# Patient Record
Sex: MALE | Race: OTHER | NOT HISPANIC OR LATINO | Employment: UNEMPLOYED | URBAN - METROPOLITAN AREA
[De-identification: names, ages, dates, MRNs, and addresses within clinical notes are randomized per-mention and may not be internally consistent; named-entity substitution may affect disease eponyms.]

---

## 2019-10-11 ENCOUNTER — TRANSCRIBE ORDERS (OUTPATIENT)
Dept: VASCULAR SURGERY | Facility: CLINIC | Age: 64
End: 2019-10-11

## 2019-10-11 DIAGNOSIS — I73.9 PAD (PERIPHERAL ARTERY DISEASE) (HCC): Primary | ICD-10-CM

## 2019-10-22 ENCOUNTER — TELEPHONE (OUTPATIENT)
Dept: VASCULAR SURGERY | Facility: CLINIC | Age: 64
End: 2019-10-22

## 2019-10-22 NOTE — TELEPHONE ENCOUNTER
Called [facility] and spoke with Arelis and arranged transportation for pts appt on 10/24 in the [place] office  I called the pt and let him know the above information

## 2019-10-24 ENCOUNTER — OFFICE VISIT (OUTPATIENT)
Dept: VASCULAR SURGERY | Facility: CLINIC | Age: 64
End: 2019-10-24
Payer: COMMERCIAL

## 2019-10-24 VITALS
SYSTOLIC BLOOD PRESSURE: 138 MMHG | TEMPERATURE: 96.6 F | WEIGHT: 146 LBS | BODY MASS INDEX: 23.46 KG/M2 | HEIGHT: 66 IN | DIASTOLIC BLOOD PRESSURE: 82 MMHG | RESPIRATION RATE: 18 BRPM | HEART RATE: 76 BPM

## 2019-10-24 DIAGNOSIS — I73.9 CLAUDICATION OF RIGHT LOWER EXTREMITY (HCC): Primary | ICD-10-CM

## 2019-10-24 DIAGNOSIS — I73.9 PAD (PERIPHERAL ARTERY DISEASE) (HCC): ICD-10-CM

## 2019-10-24 PROCEDURE — 99213 OFFICE O/P EST LOW 20 MIN: CPT | Performed by: SURGERY

## 2019-10-24 RX ORDER — RIVAROXABAN 20 MG/1
TABLET, FILM COATED ORAL
Refills: 0 | COMMUNITY
Start: 2019-10-11 | End: 2020-04-03 | Stop reason: ALTCHOICE

## 2019-10-24 RX ORDER — RIVAROXABAN 15 MG/1
15 TABLET, FILM COATED ORAL 2 TIMES DAILY
Refills: 0 | COMMUNITY
Start: 2019-10-10 | End: 2020-04-03 | Stop reason: ALTCHOICE

## 2019-10-24 RX ORDER — AMLODIPINE BESYLATE 5 MG/1
TABLET ORAL
COMMUNITY
Start: 2019-10-11 | End: 2020-04-03 | Stop reason: SDUPTHER

## 2019-10-24 RX ORDER — ATORVASTATIN CALCIUM 40 MG/1
40 TABLET, FILM COATED ORAL
Refills: 3 | COMMUNITY
Start: 2019-10-11 | End: 2020-04-03 | Stop reason: SDUPTHER

## 2019-10-24 RX ORDER — ASPIRIN 81 MG/1
81 TABLET ORAL DAILY
Refills: 3 | COMMUNITY
Start: 2019-10-11 | End: 2020-04-03 | Stop reason: SDUPTHER

## 2019-10-24 NOTE — LETTER
October 24, 2019     Shivani Garcia MD  [facility]    Patient: Jesenia Mc   YOB: 1955   Date of Visit: 10/24/2019       Dear Dr Abram Cervantes:    Thank you for referring Jesenia Mc to me for evaluation  Below are my notes for this consultation  If you have questions, please do not hesitate to call me  I look forward to following your patient along with you           Sincerely,        Mayuri Leong MD        CC: [facility]

## 2019-10-24 NOTE — PROGRESS NOTES
Assessment/Plan:    Claudication of right lower extremity ([facility] )  Presents with improving claudication symptoms with history of right popliteal artery occlusion  Is currently on anticoagulation medications is also helping from a symptom standpoint  He may be having mild ischemic rest pain of his right foot which is improve with reverse Trendelenburg position which he will set up at home  Plan:  Follow-up lower extremity arterial study in 3 months and then outpatient appointment for further evaluation and recommendations       Diagnoses and all orders for this visit:    Claudication of right lower extremity ([facility] )    PAD (peripheral artery disease) ([place] [address] )  -     Ambulatory referral to Vascular Surgery    Other orders  -     amLODIPine (NORVASC) 5 mg tablet  -     aspirin (ECOTRIN LOW STRENGTH) 81 mg EC tablet; Take 81 mg by mouth daily  -     atorvastatin (LIPITOR) 40 mg tablet; Take 40 mg by mouth daily at bedtime  -     XARELTO 15 MG tablet; Take 15 mg by mouth 2 (two) times a day  -     XARELTO 20 MG tablet; TAKE ONE TABLET BY MOUTH DAILY AFTER TAKING 21 DAYS OF XARELTO 15MG        Subjective:      Patient ID: Cecilia Jauregui is a 66 y o  male  HPI history of right popliteal artery occlusion with pain and discomfort right lower extremity  This has improved over time he is having possibly mild ischemic rest pain of asked him to keep his leg down with reverse Trendelenburg position while sleeping  No tissue loss at this time      The following portions of the patient's history were reviewed and updated as appropriate: allergies, current medications, past family history, past medical history, past social history, past surgical history and problem list     Review of Systems  right lower extremity claudication, no tissue loss, no GI  symptoms no conscious symptoms no skin rash all other systems neck  Objective:      /82 (BP Location: Right arm, Patient Position: Sitting, Cuff Size: Adult)   Pulse 76   Temp Marcus Scanlon ) 96 6 °F (35 9 °C) (Tympanic)   Resp 18   Ht 5' 6" (1 676 m)   Wt 66 2 kg (146 lb)   BMI 23 57 kg/m²          Physical Exam      Oriented x3 no evidence of clinical depression  Eyes:  Sclera non-icteric    Skin: normal without evidence of inflammation    Neck is supple carotid pulses equal bilaterally no bruits heard    Chest lungs clear, heart regular rhythm  Abdomen soft nontender no evidence of pulsatile masses  Pulses are palpable bilateral Femoral    Neurological exam intact cranial nerves 2-12 grossly intact no gross motor sensory deficits detected  Imaging viewed and reviewed with Patient  I have reviewed and made appropriate changes to the review of systems input by the medical assistant  Vitals:    10/24/19 1128   BP: 138/82   BP Location: Right arm   Patient Position: Sitting   Cuff Size: Adult   Pulse: 76   Resp: 18   Temp: (!) 96 6 °F (35 9 °C)   TempSrc: Tympanic   Weight: 66 2 kg (146 lb)   Height: 5' 6" (1 676 m)       Patient Active Problem List   Diagnosis    Claudication of right lower extremity (HCC)       No past surgical history on file  No family history on file      Social History     Socioeconomic History    Marital status: Single     Spouse name: Not on file    Number of children: Not on file    Years of education: Not on file    Highest education level: Not on file   Occupational History    Not on file   Social Needs    Financial resource strain: Not on file    Food insecurity:     Worry: Not on file     Inability: Not on file    Transportation needs:     Medical: Not on file     Non-medical: Not on file   Tobacco Use    Smoking status: Not on file   Substance and Sexual Activity    Alcohol use: Not on file    Drug use: Not on file    Sexual activity: Not on file   Lifestyle    Physical activity:     Days per week: Not on file     Minutes per session: Not on file    Stress: Not on file   Relationships    Social connections:     Talks on phone: Not on file     Gets together: Not on file     Attends Alevism service: Not on file     Active member of club or organization: Not on file     Attends meetings of clubs or organizations: Not on file     Relationship status: Not on file    Intimate partner violence:     Fear of current or ex partner: Not on file     Emotionally abused: Not on file     Physically abused: Not on file     Forced sexual activity: Not on file   Other Topics Concern    Not on file   Social History Narrative    Not on file       No Known Allergies      Current Outpatient Medications:     amLODIPine (NORVASC) 5 mg tablet, , Disp: , Rfl:     aspirin (ECOTRIN LOW STRENGTH) 81 mg EC tablet, Take 81 mg by mouth daily, Disp: , Rfl: 3    atorvastatin (LIPITOR) 40 mg tablet, Take 40 mg by mouth daily at bedtime, Disp: , Rfl: 3    XARELTO 15 MG tablet, Take 15 mg by mouth 2 (two) times a day, Disp: , Rfl: 0    XARELTO 20 MG tablet, TAKE ONE TABLET BY MOUTH DAILY AFTER TAKING 21 DAYS OF XARELTO 15MG, Disp: , Rfl: 0

## 2019-10-24 NOTE — PATIENT INSTRUCTIONS
Presents with improving claudication symptoms with history of right popliteal artery occlusion  Is currently on anticoagulation medications is also helping from a symptom standpoint  He may be having mild ischemic rest pain of his right foot which is improve with reverse Trendelenburg position which he will set up at home      Plan:  Follow-up lower extremity arterial study in 3 months and then outpatient appointment for further evaluation and recommendations

## 2020-01-06 ENCOUNTER — TELEPHONE (OUTPATIENT)
Dept: ADMINISTRATIVE | Facility: HOSPITAL | Age: 65
End: 2020-01-06

## 2020-01-06 NOTE — TELEPHONE ENCOUNTER
----- Message from Araceli Tamayo sent at 10/18/2018  8:39 AM CDT -----  Contact: Self   Pt calling to speak to a nurse regarding health. [phone]     Lmom to schedule BRANDO and OV for 3 m f/u

## 2020-04-03 ENCOUNTER — TELEMEDICINE (OUTPATIENT)
Dept: FAMILY MEDICINE CLINIC | Facility: CLINIC | Age: 65
End: 2020-04-03
Payer: COMMERCIAL

## 2020-04-03 DIAGNOSIS — E78.00 HYPERCHOLESTEREMIA: ICD-10-CM

## 2020-04-03 DIAGNOSIS — I73.9 CLAUDICATION OF RIGHT LOWER EXTREMITY (HCC): ICD-10-CM

## 2020-04-03 DIAGNOSIS — M25.50 CHRONIC PAIN OF MULTIPLE JOINTS: ICD-10-CM

## 2020-04-03 DIAGNOSIS — I10 ESSENTIAL HYPERTENSION: Primary | ICD-10-CM

## 2020-04-03 DIAGNOSIS — Z86.718 HISTORY OF DVT (DEEP VEIN THROMBOSIS): ICD-10-CM

## 2020-04-03 DIAGNOSIS — G89.29 CHRONIC PAIN OF MULTIPLE JOINTS: ICD-10-CM

## 2020-04-03 PROCEDURE — G2012 BRIEF CHECK IN BY MD/QHP: HCPCS | Performed by: NURSE PRACTITIONER

## 2020-04-03 RX ORDER — ASPIRIN 81 MG/1
81 TABLET ORAL DAILY
Qty: 90 TABLET | Refills: 0 | Status: SHIPPED | OUTPATIENT
Start: 2020-04-03 | End: 2020-07-02

## 2020-04-03 RX ORDER — ATORVASTATIN CALCIUM 40 MG/1
40 TABLET, FILM COATED ORAL
Qty: 90 TABLET | Refills: 0 | Status: SHIPPED | OUTPATIENT
Start: 2020-04-03 | End: 2020-07-02

## 2020-04-03 RX ORDER — MELOXICAM 15 MG/1
15 TABLET ORAL DAILY
Qty: 90 TABLET | Refills: 0 | Status: SHIPPED | OUTPATIENT
Start: 2020-04-03 | End: 2020-07-02

## 2020-04-03 RX ORDER — AMMONIUM LACTATE 12 G/100G
1 LOTION TOPICAL 2 TIMES DAILY PRN
COMMUNITY
End: 2020-04-03 | Stop reason: SDUPTHER

## 2020-04-03 RX ORDER — MELOXICAM 15 MG/1
15 TABLET ORAL DAILY
COMMUNITY
End: 2020-04-03 | Stop reason: SDUPTHER

## 2020-04-03 RX ORDER — AMMONIUM LACTATE 12 G/100G
1 LOTION TOPICAL 2 TIMES DAILY PRN
Qty: 400 G | Refills: 0 | Status: SHIPPED | OUTPATIENT
Start: 2020-04-03 | End: 2020-07-02

## 2020-04-03 RX ORDER — AMLODIPINE BESYLATE 5 MG/1
5 TABLET ORAL DAILY
Qty: 90 TABLET | Refills: 0 | Status: SHIPPED | OUTPATIENT
Start: 2020-04-03 | End: 2020-07-02

## 2020-07-02 DIAGNOSIS — Z86.718 HISTORY OF DVT (DEEP VEIN THROMBOSIS): ICD-10-CM

## 2020-07-02 DIAGNOSIS — G89.29 CHRONIC PAIN OF MULTIPLE JOINTS: ICD-10-CM

## 2020-07-02 DIAGNOSIS — I10 ESSENTIAL HYPERTENSION: ICD-10-CM

## 2020-07-02 DIAGNOSIS — E78.00 HYPERCHOLESTEREMIA: ICD-10-CM

## 2020-07-02 DIAGNOSIS — M25.50 CHRONIC PAIN OF MULTIPLE JOINTS: ICD-10-CM

## 2020-07-02 DIAGNOSIS — I73.9 CLAUDICATION OF RIGHT LOWER EXTREMITY (HCC): ICD-10-CM

## 2020-07-02 RX ORDER — ASPIRIN 81 MG/1
TABLET, COATED ORAL
Qty: 90 TABLET | Refills: 0 | Status: SHIPPED | OUTPATIENT
Start: 2020-07-02 | End: 2020-11-27 | Stop reason: SDUPTHER

## 2020-07-02 RX ORDER — ATORVASTATIN CALCIUM 40 MG/1
40 TABLET, FILM COATED ORAL
Qty: 90 TABLET | Refills: 0 | Status: SHIPPED | OUTPATIENT
Start: 2020-07-02 | End: 2020-11-27 | Stop reason: SDUPTHER

## 2020-07-02 RX ORDER — MELOXICAM 15 MG/1
15 TABLET ORAL DAILY
Qty: 90 TABLET | Refills: 0 | Status: SHIPPED | OUTPATIENT
Start: 2020-07-02 | End: 2020-10-14 | Stop reason: SDUPTHER

## 2020-07-02 RX ORDER — AMMONIUM LACTATE 12 G/100G
1 LOTION TOPICAL 2 TIMES DAILY PRN
Qty: 400 G | Refills: 0 | Status: SHIPPED | OUTPATIENT
Start: 2020-07-02 | End: 2021-01-25 | Stop reason: SDUPTHER

## 2020-07-02 RX ORDER — AMLODIPINE BESYLATE 5 MG/1
5 TABLET ORAL DAILY
Qty: 90 TABLET | Refills: 0 | Status: SHIPPED | OUTPATIENT
Start: 2020-07-02 | End: 2020-11-27

## 2020-07-09 ENCOUNTER — TRANSCRIBE ORDERS (OUTPATIENT)
Dept: ADMINISTRATIVE | Facility: HOSPITAL | Age: 65
End: 2020-07-09

## 2020-07-09 DIAGNOSIS — J44.9 OBSTRUCTIVE CHRONIC BRONCHITIS WITHOUT EXACERBATION (HCC): Primary | ICD-10-CM

## 2020-10-01 DIAGNOSIS — J44.9 CHRONIC OBSTRUCTIVE PULMONARY DISEASE, UNSPECIFIED COPD TYPE (HCC): Primary | ICD-10-CM

## 2020-10-14 DIAGNOSIS — M25.50 CHRONIC PAIN OF MULTIPLE JOINTS: ICD-10-CM

## 2020-10-14 DIAGNOSIS — G89.29 CHRONIC PAIN OF MULTIPLE JOINTS: ICD-10-CM

## 2020-10-14 RX ORDER — MELOXICAM 15 MG/1
15 TABLET ORAL DAILY
Qty: 90 TABLET | Refills: 0 | Status: SHIPPED | OUTPATIENT
Start: 2020-10-14 | End: 2020-11-19

## 2020-11-03 DIAGNOSIS — M25.50 ARTHRALGIA, UNSPECIFIED JOINT: Primary | ICD-10-CM

## 2020-11-05 DIAGNOSIS — I10 ESSENTIAL HYPERTENSION: ICD-10-CM

## 2020-11-05 RX ORDER — AMLODIPINE BESYLATE 5 MG/1
5 TABLET ORAL DAILY
Qty: 90 TABLET | Refills: 0 | Status: CANCELLED | OUTPATIENT
Start: 2020-11-05 | End: 2021-02-03

## 2020-11-05 RX ORDER — AMLODIPINE BESYLATE 2.5 MG/1
2.5 TABLET ORAL DAILY
COMMUNITY
End: 2020-11-05 | Stop reason: SDUPTHER

## 2020-11-06 RX ORDER — AMLODIPINE BESYLATE 2.5 MG/1
2.5 TABLET ORAL DAILY
Qty: 90 TABLET | Refills: 1 | Status: SHIPPED | OUTPATIENT
Start: 2020-11-06 | End: 2020-11-27

## 2020-11-18 DIAGNOSIS — G89.29 CHRONIC PAIN OF MULTIPLE JOINTS: ICD-10-CM

## 2020-11-18 DIAGNOSIS — M25.50 CHRONIC PAIN OF MULTIPLE JOINTS: ICD-10-CM

## 2020-11-19 RX ORDER — MELOXICAM 15 MG/1
15 TABLET ORAL DAILY
Qty: 90 TABLET | Refills: 0 | Status: SHIPPED | OUTPATIENT
Start: 2020-11-19 | End: 2020-11-27 | Stop reason: SDUPTHER

## 2020-11-25 DIAGNOSIS — M25.50 ARTHRALGIA, UNSPECIFIED JOINT: ICD-10-CM

## 2020-11-27 ENCOUNTER — TELEMEDICINE (OUTPATIENT)
Dept: FAMILY MEDICINE CLINIC | Facility: CLINIC | Age: 65
End: 2020-11-27
Payer: COMMERCIAL

## 2020-11-27 ENCOUNTER — TELEPHONE (OUTPATIENT)
Dept: FAMILY MEDICINE CLINIC | Facility: CLINIC | Age: 65
End: 2020-11-27

## 2020-11-27 DIAGNOSIS — I73.9 CLAUDICATION OF RIGHT LOWER EXTREMITY (HCC): ICD-10-CM

## 2020-11-27 DIAGNOSIS — E78.00 HYPERCHOLESTEREMIA: ICD-10-CM

## 2020-11-27 DIAGNOSIS — M25.50 CHRONIC PAIN OF MULTIPLE JOINTS: ICD-10-CM

## 2020-11-27 DIAGNOSIS — Z11.59 NEED FOR HEPATITIS C SCREENING TEST: ICD-10-CM

## 2020-11-27 DIAGNOSIS — J44.9 CHRONIC OBSTRUCTIVE PULMONARY DISEASE, UNSPECIFIED COPD TYPE (HCC): Primary | ICD-10-CM

## 2020-11-27 DIAGNOSIS — Z86.718 HISTORY OF DVT (DEEP VEIN THROMBOSIS): ICD-10-CM

## 2020-11-27 DIAGNOSIS — J44.9 CHRONIC OBSTRUCTIVE PULMONARY DISEASE, UNSPECIFIED COPD TYPE (HCC): ICD-10-CM

## 2020-11-27 DIAGNOSIS — I10 ESSENTIAL HYPERTENSION: Primary | ICD-10-CM

## 2020-11-27 DIAGNOSIS — Z12.11 SCREENING FOR COLON CANCER: ICD-10-CM

## 2020-11-27 DIAGNOSIS — G89.29 CHRONIC PAIN OF MULTIPLE JOINTS: ICD-10-CM

## 2020-11-27 PROBLEM — J45.40 MODERATE PERSISTENT ASTHMA WITHOUT COMPLICATION: Status: ACTIVE | Noted: 2020-11-27

## 2020-11-27 PROCEDURE — 99213 OFFICE O/P EST LOW 20 MIN: CPT | Performed by: NURSE PRACTITIONER

## 2020-11-27 RX ORDER — FLUTICASONE FUROATE AND VILANTEROL 100; 25 UG/1; UG/1
1 POWDER RESPIRATORY (INHALATION) DAILY
Qty: 3 INHALER | Refills: 1 | Status: SHIPPED | OUTPATIENT
Start: 2020-11-27 | End: 2020-12-30

## 2020-11-27 RX ORDER — ATORVASTATIN CALCIUM 40 MG/1
40 TABLET, FILM COATED ORAL
Qty: 90 TABLET | Refills: 0 | Status: SHIPPED | OUTPATIENT
Start: 2020-11-27 | End: 2021-02-24

## 2020-11-27 RX ORDER — AMLODIPINE BESYLATE 5 MG/1
5 TABLET ORAL DAILY
Qty: 90 TABLET | Refills: 1 | Status: SHIPPED | OUTPATIENT
Start: 2020-11-27 | End: 2021-02-09

## 2020-11-27 RX ORDER — ASPIRIN 81 MG/1
81 TABLET ORAL DAILY
Qty: 90 TABLET | Refills: 0 | Status: SHIPPED | OUTPATIENT
Start: 2020-11-27 | End: 2021-01-05 | Stop reason: SDUPTHER

## 2020-11-27 RX ORDER — MELOXICAM 15 MG/1
15 TABLET ORAL DAILY
Qty: 90 TABLET | Refills: 0 | Status: SHIPPED | OUTPATIENT
Start: 2020-11-27 | End: 2021-02-03

## 2020-12-14 ENCOUNTER — TELEPHONE (OUTPATIENT)
Dept: FAMILY MEDICINE CLINIC | Facility: CLINIC | Age: 65
End: 2020-12-14

## 2020-12-16 DIAGNOSIS — M25.50 ARTHRALGIA, UNSPECIFIED JOINT: ICD-10-CM

## 2020-12-30 ENCOUNTER — TELEPHONE (OUTPATIENT)
Dept: FAMILY MEDICINE CLINIC | Facility: CLINIC | Age: 65
End: 2020-12-30

## 2020-12-30 DIAGNOSIS — J45.20 MILD INTERMITTENT ASTHMA, UNSPECIFIED WHETHER COMPLICATED: ICD-10-CM

## 2020-12-30 DIAGNOSIS — J41.0 SIMPLE CHRONIC BRONCHITIS (HCC): Primary | ICD-10-CM

## 2021-01-05 DIAGNOSIS — I73.9 CLAUDICATION OF RIGHT LOWER EXTREMITY (HCC): ICD-10-CM

## 2021-01-05 DIAGNOSIS — Z86.718 HISTORY OF DVT (DEEP VEIN THROMBOSIS): ICD-10-CM

## 2021-01-05 RX ORDER — ASPIRIN 81 MG/1
81 TABLET ORAL DAILY
Qty: 90 TABLET | Refills: 0 | Status: SHIPPED | OUTPATIENT
Start: 2021-01-05 | End: 2021-01-25 | Stop reason: SDUPTHER

## 2021-01-06 DIAGNOSIS — M25.50 ARTHRALGIA, UNSPECIFIED JOINT: ICD-10-CM

## 2021-01-25 DIAGNOSIS — Z86.718 HISTORY OF DVT (DEEP VEIN THROMBOSIS): ICD-10-CM

## 2021-01-25 DIAGNOSIS — I73.9 CLAUDICATION OF RIGHT LOWER EXTREMITY (HCC): ICD-10-CM

## 2021-01-25 RX ORDER — ASPIRIN 81 MG/1
81 TABLET ORAL DAILY
Qty: 90 TABLET | Refills: 0 | Status: SHIPPED | OUTPATIENT
Start: 2021-01-25 | End: 2021-02-11

## 2021-01-25 RX ORDER — AMMONIUM LACTATE 12 G/100G
1 LOTION TOPICAL 2 TIMES DAILY PRN
Qty: 400 G | Refills: 0 | Status: SHIPPED | OUTPATIENT
Start: 2021-01-25 | End: 2021-02-11

## 2021-02-03 DIAGNOSIS — G89.29 CHRONIC PAIN OF MULTIPLE JOINTS: ICD-10-CM

## 2021-02-03 DIAGNOSIS — M25.50 CHRONIC PAIN OF MULTIPLE JOINTS: ICD-10-CM

## 2021-02-03 RX ORDER — MELOXICAM 15 MG/1
15 TABLET ORAL DAILY
Qty: 90 TABLET | Refills: 0 | Status: SHIPPED | OUTPATIENT
Start: 2021-02-03 | End: 2021-03-24

## 2021-02-09 DIAGNOSIS — M25.50 ARTHRALGIA, UNSPECIFIED JOINT: ICD-10-CM

## 2021-02-09 DIAGNOSIS — I10 ESSENTIAL HYPERTENSION: ICD-10-CM

## 2021-02-09 RX ORDER — AMLODIPINE BESYLATE 5 MG/1
5 TABLET ORAL DAILY
Qty: 90 TABLET | Refills: 1 | Status: SHIPPED | OUTPATIENT
Start: 2021-02-09 | End: 2021-05-24 | Stop reason: SDUPTHER

## 2021-02-11 DIAGNOSIS — I73.9 CLAUDICATION OF RIGHT LOWER EXTREMITY (HCC): ICD-10-CM

## 2021-02-11 DIAGNOSIS — Z86.718 HISTORY OF DVT (DEEP VEIN THROMBOSIS): ICD-10-CM

## 2021-02-11 RX ORDER — ASPIRIN 81 MG/1
81 TABLET ORAL DAILY
Qty: 90 TABLET | Refills: 0 | Status: SHIPPED | OUTPATIENT
Start: 2021-02-11 | End: 2021-05-24 | Stop reason: SDUPTHER

## 2021-02-11 RX ORDER — AMMONIUM LACTATE 12 G/100G
1 LOTION TOPICAL 2 TIMES DAILY PRN
Qty: 450 G | Refills: 1 | Status: SHIPPED | OUTPATIENT
Start: 2021-02-11 | End: 2021-03-22

## 2021-02-23 DIAGNOSIS — E78.00 HYPERCHOLESTEREMIA: ICD-10-CM

## 2021-02-24 RX ORDER — ATORVASTATIN CALCIUM 40 MG/1
40 TABLET, FILM COATED ORAL
Qty: 90 TABLET | Refills: 0 | Status: SHIPPED | OUTPATIENT
Start: 2021-02-24 | End: 2021-03-10

## 2021-03-03 DIAGNOSIS — M25.50 ARTHRALGIA, UNSPECIFIED JOINT: ICD-10-CM

## 2021-03-10 DIAGNOSIS — E78.00 HYPERCHOLESTEREMIA: ICD-10-CM

## 2021-03-10 RX ORDER — ATORVASTATIN CALCIUM 40 MG/1
40 TABLET, FILM COATED ORAL
Qty: 90 TABLET | Refills: 0 | Status: SHIPPED | OUTPATIENT
Start: 2021-03-10 | End: 2021-05-24 | Stop reason: SDUPTHER

## 2021-03-22 DIAGNOSIS — I73.9 CLAUDICATION OF RIGHT LOWER EXTREMITY (HCC): ICD-10-CM

## 2021-03-22 RX ORDER — AMMONIUM LACTATE 12 G/100G
1 LOTION TOPICAL 2 TIMES DAILY PRN
Qty: 450 G | Refills: 1 | Status: SHIPPED | OUTPATIENT
Start: 2021-03-22 | End: 2021-04-06 | Stop reason: ALTCHOICE

## 2021-03-24 ENCOUNTER — TELEPHONE (OUTPATIENT)
Dept: FAMILY MEDICINE CLINIC | Facility: CLINIC | Age: 66
End: 2021-03-24

## 2021-03-24 DIAGNOSIS — M25.50 CHRONIC PAIN OF MULTIPLE JOINTS: ICD-10-CM

## 2021-03-24 DIAGNOSIS — G89.29 CHRONIC PAIN OF MULTIPLE JOINTS: ICD-10-CM

## 2021-03-24 DIAGNOSIS — M25.50 ARTHRALGIA, UNSPECIFIED JOINT: ICD-10-CM

## 2021-03-24 RX ORDER — MELOXICAM 15 MG/1
15 TABLET ORAL DAILY
Qty: 90 TABLET | Refills: 0 | Status: SHIPPED | OUTPATIENT
Start: 2021-03-24 | End: 2021-05-13

## 2021-03-24 NOTE — TELEPHONE ENCOUNTER
Noted that concern was change of mental status, patient is going all over the house having BMs and urinating and not cognizant or aware of himself     We have tried calling back and leaving messages and left text message to have patient be brought to the hospital to get evaluated    Unable to get neither patient nor brother     After multiple calls to contact pt or brother and not successful-->we contacted that police department at [facility] to do an  officer check  [address]  by at the home

## 2021-03-26 RX ORDER — AMLODIPINE BESYLATE 2.5 MG/1
2.5 TABLET ORAL DAILY
COMMUNITY
Start: 2021-02-03 | End: 2021-05-14 | Stop reason: ALTCHOICE

## 2021-04-06 ENCOUNTER — TELEPHONE (OUTPATIENT)
Dept: FAMILY MEDICINE CLINIC | Facility: CLINIC | Age: 66
End: 2021-04-06

## 2021-04-06 ENCOUNTER — LAB (OUTPATIENT)
Dept: LAB | Facility: HOSPITAL | Age: 66
End: 2021-04-06
Payer: COMMERCIAL

## 2021-04-06 ENCOUNTER — TRANSCRIBE ORDERS (OUTPATIENT)
Dept: ADMINISTRATIVE | Facility: HOSPITAL | Age: 66
End: 2021-04-06

## 2021-04-06 ENCOUNTER — HOSPITAL ENCOUNTER (OUTPATIENT)
Dept: CT IMAGING | Facility: HOSPITAL | Age: 66
Discharge: HOME | End: 2021-04-06
Payer: COMMERCIAL

## 2021-04-06 ENCOUNTER — HOSPITAL ENCOUNTER (OUTPATIENT)
Dept: RADIOLOGY | Facility: HOSPITAL | Age: 66
Discharge: HOME | End: 2021-04-06
Payer: COMMERCIAL

## 2021-04-06 ENCOUNTER — OFFICE VISIT (OUTPATIENT)
Dept: FAMILY MEDICINE CLINIC | Facility: CLINIC | Age: 66
End: 2021-04-06
Payer: COMMERCIAL

## 2021-04-06 VITALS
DIASTOLIC BLOOD PRESSURE: 70 MMHG | RESPIRATION RATE: 17 BRPM | TEMPERATURE: 97 F | HEIGHT: 67 IN | HEART RATE: 84 BPM | SYSTOLIC BLOOD PRESSURE: 138 MMHG | BODY MASS INDEX: 22.13 KG/M2 | OXYGEN SATURATION: 99 % | WEIGHT: 141 LBS

## 2021-04-06 DIAGNOSIS — R41.89 COGNITIVE CHANGE: Primary | ICD-10-CM

## 2021-04-06 DIAGNOSIS — R94.31 ABNORMAL EKG: ICD-10-CM

## 2021-04-06 DIAGNOSIS — R45.1 AGITATED: ICD-10-CM

## 2021-04-06 DIAGNOSIS — R41.3 MEMORY DEFICIT: ICD-10-CM

## 2021-04-06 DIAGNOSIS — R06.02 SHORTNESS OF BREATH: ICD-10-CM

## 2021-04-06 DIAGNOSIS — R63.4 WEIGHT LOSS: ICD-10-CM

## 2021-04-06 DIAGNOSIS — R41.89 COGNITIVE CHANGE: ICD-10-CM

## 2021-04-06 DIAGNOSIS — B34.9 VIRAL INFECTION, UNSPECIFIED: ICD-10-CM

## 2021-04-06 DIAGNOSIS — R46.89 COGNITIVE AND BEHAVIORAL CHANGES: ICD-10-CM

## 2021-04-06 DIAGNOSIS — Z87.898 HISTORY OF DRUG USE: ICD-10-CM

## 2021-04-06 DIAGNOSIS — Z87.898 PERSONAL HISTORY OF OTHER LYMPHATIC AND HEMATOPOIETIC NEOPLASM: ICD-10-CM

## 2021-04-06 DIAGNOSIS — R41.89 COGNITIVE AND BEHAVIORAL CHANGES: ICD-10-CM

## 2021-04-06 DIAGNOSIS — F68.8 PERSONALITY CHANGE: ICD-10-CM

## 2021-04-06 DIAGNOSIS — R41.0 CONFUSION: ICD-10-CM

## 2021-04-06 DIAGNOSIS — R41.89 COGNITIVE AND BEHAVIORAL CHANGES: Primary | ICD-10-CM

## 2021-04-06 DIAGNOSIS — Z72.89 ALCOHOL USE: ICD-10-CM

## 2021-04-06 DIAGNOSIS — N39.46 MIXED STRESS AND URGE URINARY INCONTINENCE: ICD-10-CM

## 2021-04-06 DIAGNOSIS — R79.89 POSITIVE D DIMER: ICD-10-CM

## 2021-04-06 DIAGNOSIS — R46.89 COGNITIVE AND BEHAVIORAL CHANGES: Primary | ICD-10-CM

## 2021-04-06 LAB
ALBUMIN SERPL BCP-MCNC: 5.2 G/DL (ref 3.4–4.8)
ALP SERPL-CCNC: 57.8 U/L (ref 10–129)
ALT SERPL W P-5'-P-CCNC: 14 U/L (ref 5–63)
AMMONIA PLAS-SCNC: 27.45 UMOL/L
ANION GAP SERPL CALCULATED.3IONS-SCNC: 8 MMOL/L (ref 4–13)
AST SERPL W P-5'-P-CCNC: 18 U/L (ref 15–41)
BACTERIA UR QL AUTO: ABNORMAL /HPF
BASOPHILS # BLD AUTO: 0.03 THOUSANDS/ΜL (ref 0–0.1)
BASOPHILS NFR BLD AUTO: 1 % (ref 0–1)
BILIRUB SERPL-MCNC: 0.37 MG/DL (ref 0.3–1.2)
BILIRUB UR QL STRIP: ABNORMAL
BNP SERPL-MCNC: 20.3 PG/ML (ref 1–100)
BUN SERPL-MCNC: 14 MG/DL (ref 6–20)
CALCIUM SERPL-MCNC: 10.6 MG/DL (ref 8.4–10.2)
CHLORIDE SERPL-SCNC: 105 MMOL/L (ref 96–108)
CHOLEST SERPL-MCNC: 150 MG/DL
CLARITY UR: ABNORMAL
CO2 SERPL-SCNC: 29 MMOL/L (ref 22–33)
COLOR UR: YELLOW
CREAT SERPL-MCNC: 1.09 MG/DL (ref 0.5–1.2)
D DIMER PPP FEU-MCNC: 1.11 MG/L FEU (ref 0.19–0.49)
EOSINOPHIL # BLD AUTO: 0.18 THOUSAND/ΜL (ref 0–0.61)
EOSINOPHIL NFR BLD AUTO: 3 % (ref 0–6)
ERYTHROCYTE [DISTWIDTH] IN BLOOD BY AUTOMATED COUNT: 13.4 % (ref 11.6–15.1)
EST. AVERAGE GLUCOSE BLD GHB EST-MCNC: 114 MG/DL
GFR SERPL CREATININE-BSD FRML MDRD: 72 ML/MIN/1.73SQ M
GLUCOSE P FAST SERPL-MCNC: 101 MG/DL (ref 70–105)
GLUCOSE UR STRIP-MCNC: NEGATIVE MG/DL
HAV IGM SER QL: NORMAL
HBA1C MFR BLD: 5.6 %
HBV CORE IGM SER QL: NORMAL
HBV SURFACE AG SER QL: NORMAL
HCT VFR BLD AUTO: 45.3 % (ref 36.5–49.3)
HCV AB SER QL: NORMAL
HDLC SERPL-MCNC: 32 MG/DL
HGB BLD-MCNC: 15.6 G/DL (ref 12–17)
HGB UR QL STRIP.AUTO: NEGATIVE
IMM GRANULOCYTES # BLD AUTO: 0.01 THOUSAND/UL (ref 0–0.2)
IMM GRANULOCYTES NFR BLD AUTO: 0 % (ref 0–2)
KETONES UR STRIP-MCNC: NEGATIVE MG/DL
LDLC SERPL CALC-MCNC: 96 MG/DL (ref 0–100)
LEUKOCYTE ESTERASE UR QL STRIP: NEGATIVE
LYMPHOCYTES # BLD AUTO: 1.97 THOUSANDS/ΜL (ref 0.6–4.47)
LYMPHOCYTES NFR BLD AUTO: 30 % (ref 14–44)
MCH RBC QN AUTO: 30.9 PG (ref 26.8–34.3)
MCHC RBC AUTO-ENTMCNC: 34.4 G/DL (ref 31.4–37.4)
MCV RBC AUTO: 90 FL (ref 82–98)
MONOCYTES # BLD AUTO: 0.43 THOUSAND/ΜL (ref 0.17–1.22)
MONOCYTES NFR BLD AUTO: 7 % (ref 4–12)
MUCOUS THREADS UR QL AUTO: ABNORMAL
NEUTROPHILS # BLD AUTO: 3.88 THOUSANDS/ΜL (ref 1.85–7.62)
NEUTS SEG NFR BLD AUTO: 59 % (ref 43–75)
NITRITE UR QL STRIP: NEGATIVE
NON-SQ EPI CELLS URNS QL MICRO: ABNORMAL /HPF
NONHDLC SERPL-MCNC: 118 MG/DL
PH UR STRIP.AUTO: 6 [PH]
PLATELET # BLD AUTO: 218 THOUSANDS/UL (ref 149–390)
PMV BLD AUTO: 11.4 FL (ref 8.9–12.7)
POTASSIUM SERPL-SCNC: 3.7 MMOL/L (ref 3.5–5)
PROT SERPL-MCNC: 8.5 G/DL (ref 6.4–8.3)
PROT UR STRIP-MCNC: ABNORMAL MG/DL
RBC # BLD AUTO: 5.05 MILLION/UL (ref 3.88–5.62)
RBC #/AREA URNS AUTO: ABNORMAL /HPF
SODIUM SERPL-SCNC: 142 MMOL/L (ref 133–145)
SP GR UR STRIP.AUTO: >=1.03 (ref 1–1.03)
TRIGL SERPL-MCNC: 111.7 MG/DL
TROPONIN I SERPL-MCNC: <0.03 NG/ML (ref 0–0.07)
TSH SERPL DL<=0.05 MIU/L-ACNC: 1.32 UIU/ML (ref 0.34–5.6)
UROBILINOGEN UR QL STRIP.AUTO: 1 E.U./DL
WBC # BLD AUTO: 6.5 THOUSAND/UL (ref 4.31–10.16)
WBC #/AREA URNS AUTO: ABNORMAL /HPF

## 2021-04-06 PROCEDURE — 81001 URINALYSIS AUTO W/SCOPE: CPT | Performed by: NURSE PRACTITIONER

## 2021-04-06 PROCEDURE — 83036 HEMOGLOBIN GLYCOSYLATED A1C: CPT

## 2021-04-06 PROCEDURE — 84443 ASSAY THYROID STIM HORMONE: CPT

## 2021-04-06 PROCEDURE — 84484 ASSAY OF TROPONIN QUANT: CPT

## 2021-04-06 PROCEDURE — 36415 COLL VENOUS BLD VENIPUNCTURE: CPT

## 2021-04-06 PROCEDURE — 71046 X-RAY EXAM CHEST 2 VIEWS: CPT

## 2021-04-06 PROCEDURE — U0003 INFECTIOUS AGENT DETECTION BY NUCLEIC ACID (DNA OR RNA); SEVERE ACUTE RESPIRATORY SYNDROME CORONAVIRUS 2 (SARS-COV-2) (CORONAVIRUS DISEASE [COVID-19]), AMPLIFIED PROBE TECHNIQUE, MAKING USE OF HIGH THROUGHPUT TECHNOLOGIES AS DESCRIBED BY CMS-2020-01-R: HCPCS | Performed by: NURSE PRACTITIONER

## 2021-04-06 PROCEDURE — 85379 FIBRIN DEGRADATION QUANT: CPT

## 2021-04-06 PROCEDURE — 80053 COMPREHEN METABOLIC PANEL: CPT

## 2021-04-06 PROCEDURE — 86592 SYPHILIS TEST NON-TREP QUAL: CPT

## 2021-04-06 PROCEDURE — U0005 INFEC AGEN DETEC AMPLI PROBE: HCPCS | Performed by: NURSE PRACTITIONER

## 2021-04-06 PROCEDURE — 85025 COMPLETE CBC W/AUTO DIFF WBC: CPT

## 2021-04-06 PROCEDURE — 80074 ACUTE HEPATITIS PANEL: CPT

## 2021-04-06 PROCEDURE — G1004 CDSM NDSC: HCPCS

## 2021-04-06 PROCEDURE — 80061 LIPID PANEL: CPT

## 2021-04-06 PROCEDURE — 99215 OFFICE O/P EST HI 40 MIN: CPT | Performed by: NURSE PRACTITIONER

## 2021-04-06 PROCEDURE — 82140 ASSAY OF AMMONIA: CPT

## 2021-04-06 PROCEDURE — 86618 LYME DISEASE ANTIBODY: CPT

## 2021-04-06 PROCEDURE — 74018 RADEX ABDOMEN 1 VIEW: CPT

## 2021-04-06 PROCEDURE — 83880 ASSAY OF NATRIURETIC PEPTIDE: CPT

## 2021-04-06 PROCEDURE — 70450 CT HEAD/BRAIN W/O DYE: CPT

## 2021-04-06 NOTE — TELEPHONE ENCOUNTER
Called patient's brother since patient's phone was off (unable to leave message)  Patient was with brother so both are aware he has a STAT CT of chest scheduled 4/7/21 @ 2:00 PM at [address] [place] [address] Drive instructions to patient and told to call our office if any questions/issues

## 2021-04-06 NOTE — PROGRESS NOTES
Assessment/Plan:  SPENT OVER 45 minutes with patient and extensive work up and review of labs and diagnostics     Presents in office with brother   follow up multiple issues  And acute changes noted by this provider  change of mental status   weakness, frequent falls , intermittent confusion for the last 4 weeks , weight loss, personality and behavioral changes - urinating all over the house  Intermittent confusion     On exam - right sided weakness and mild deviation of the tongue to the right  Which is new to this provider  patient urinated in office while being examined   Increased headaches in the last few months   Brother states that patient sometimes would be staring at the walls and when asked what he is doing he is confused   Urinates in bed times and sometimes all over the house   No real rhyme or reason  Patient was initially recommended to go to the ER however refusing to go, brother requesting that work up is done and he will take him everywhere  He is aware that if something comes  Back abnormal he will have to go to the ER     Patient has had multiple unwitnessed falls and not sure if any injuries to the head but does have bruises  He is aware of self   But not aware of time and place   He is aware of his brother   And when asked if he sees any issues with his behavior he states" I am fine"   Does have history of Drug use - and current recent history of alcohol  Intake       Will have him follow up with Neurology and psychiatry for jassi yin awaiting complete work up       Problem List Items Addressed This Visit     None      Visit Diagnoses     Cognitive and behavioral changes    -  Primary    Relevant Orders    CT head wo contrast (Completed)    Comprehensive metabolic panel (Completed)    CBC and differential (Completed)    TSH, 3rd generation (Completed)    Lipid panel (Completed)    HEMOGLOBIN A1C W/ EAG ESTIMATION (Completed)    D-dimer, quantitative (Completed)    Hepatitis C antibody NT-BNP PRO    Troponin I (Completed)    Lyme Antibody Profile with reflex to WB (Completed)    Hepatitis panel, acute (Completed)    UA (URINE) with reflex to Scope (Completed)    XR chest pa & lateral    XR abdomen 1 view kub    Ambulatory referral to Cardiology    Ambulatory referral to Neurology    Urine Microscopic (Completed)    HEMOGLOBIN A1C W/ EAG ESTIMATION (Completed)    RPR    Ambulatory Referral to [facility]    Ambulatory referral to Neurology    Ambulatory referral to Psychiatry    Confusion        Relevant Orders    CT head wo contrast (Completed)    Comprehensive metabolic panel (Completed)    CBC and differential (Completed)    TSH, 3rd generation (Completed)    Lipid panel (Completed)    HEMOGLOBIN A1C W/ EAG ESTIMATION (Completed)    D-dimer, quantitative (Completed)    Hepatitis C antibody    NT-BNP PRO    Troponin I (Completed)    Lyme Antibody Profile with reflex to WB (Completed)    Hepatitis panel, acute (Completed)    UA (URINE) with reflex to Scope (Completed)    XR chest pa & lateral    XR abdomen 1 view kub    Ambulatory referral to Cardiology    Ambulatory referral to Neurology    Ammonia (Completed)    Urine Microscopic (Completed)    HEMOGLOBIN A1C W/ EAG ESTIMATION (Completed)    RPR    Ambulatory Referral to [address] [place] [address]    Ambulatory referral to Neurology    Ambulatory referral to Psychiatry    Memory deficit        Relevant Orders    CT head wo contrast (Completed)    Ambulatory referral to Cardiology    Ambulatory referral to Neurology    Ammonia (Completed)    HEMOGLOBIN A1C W/ EAG ESTIMATION (Completed)    RPR    Ambulatory Referral to [facility]    Ambulatory referral to Neurology    Ambulatory referral to Psychiatry    Agitated        Relevant Orders    CT head wo contrast (Completed)    Comprehensive metabolic panel (Completed)    CBC and differential (Completed)    TSH, 3rd generation (Completed)    Lipid panel (Completed)    HEMOGLOBIN A1C W/ EAG ESTIMATION (Completed) D-dimer, quantitative (Completed)    Hepatitis C antibody    NT-BNP PRO    Troponin I (Completed)    Lyme Antibody Profile with reflex to WB (Completed)    Hepatitis panel, acute (Completed)    UA (URINE) with reflex to Scope (Completed)    XR chest pa & lateral    XR abdomen 1 view kub    Ambulatory referral to Cardiology    Ambulatory referral to Neurology    Ammonia (Completed)    Urine Microscopic (Completed)    HEMOGLOBIN A1C W/ EAG ESTIMATION (Completed)    RPR    [facility] to [facility]    Ambulatory referral to Neurology    Ambulatory referral to Psychiatry    Personality change        Relevant Orders    CT head wo contrast (Completed)    Comprehensive metabolic panel (Completed)    CBC and differential (Completed)    TSH, 3rd generation (Completed)    Lipid panel (Completed)    HEMOGLOBIN A1C W/ EAG ESTIMATION (Completed)    D-dimer, quantitative (Completed)    Hepatitis C antibody    NT-BNP PRO    Troponin I (Completed)    Lyme Antibody Profile with reflex to WB (Completed)    Hepatitis panel, acute (Completed)    UA (URINE) with reflex to Scope (Completed)    XR chest pa & lateral    XR abdomen 1 view kub    Ambulatory referral to Cardiology    Ambulatory referral to Neurology    Urine Microscopic (Completed)    HEMOGLOBIN A1C W/ EAG ESTIMATION (Completed)    RPR    Ambulatory Referral to [facility]    Ambulatory referral to Neurology    Ambulatory referral to Psychiatry    Alcohol use        Relevant Orders    Ambulatory referral to Cardiology    Ambulatory referral to Neurology    HEMOGLOBIN A1C W/ EAG ESTIMATION (Completed)    Ambulatory Referral to [facility]    Ambulatory referral to Neurology    Ambulatory referral to Psychiatry    History of drug use        Relevant Orders    CT head wo contrast (Completed)    Comprehensive metabolic panel (Completed)    CBC and differential (Completed)    TSH, 3rd generation (Completed)    Lipid panel (Completed)    HEMOGLOBIN A1C W/ EAG ESTIMATION (Completed)    D-dimer, quantitative (Completed)    Hepatitis C antibody    NT-BNP PRO    Troponin I (Completed)    Lyme Antibody Profile with reflex to WB (Completed)    Hepatitis panel, acute (Completed)    UA (URINE) with reflex to Scope (Completed)    XR chest pa & lateral    XR abdomen 1 view kub    Ambulatory referral to Cardiology    Ambulatory referral to Neurology    Urine Microscopic (Completed)    Ambulatory Referral to  Place [facility]    Ambulatory referral to Neurology    Ambulatory referral to Psychiatry    Mixed stress and urge urinary incontinence        Relevant Orders    CT head wo contrast (Completed)    UA (URINE) with reflex to Scope (Completed)    Ambulatory referral to Cardiology    Ambulatory referral to Neurology    Urine Microscopic (Completed)    HEMOGLOBIN A1C W/ EAG ESTIMATION (Completed)    Ambulatory Referral to  Place [facility]    Ambulatory referral to Neurology    Ambulatory referral to Psychiatry    Viral infection, unspecified        Relevant Orders    Novel Coronavirus (Covid-19),PCR [facility] - Collected at [facility] or [facility]    Abnormal EKG        Relevant Orders    Ambulatory referral to Cardiology    Ambulatory referral to Neurology    Ambulatory Referral to [address] [place] [address] Son[facility]    Ambulatory referral to Neurology    Ambulatory referral to Psychiatry    Positive D dimer        Relevant Orders    Ambulatory referral to Neurology    Ambulatory referral to Psychiatry    CTA chest pe study    Weight loss        Relevant Orders    HEMOGLOBIN A1C W/ EAG ESTIMATION (Completed)    Ambulatory Referral to [address] [place] [address] Sonu Stewart    Ambulatory referral to Neurology    Ambulatory referral to Psychiatry    Shortness of breath        Relevant Orders    Ambulatory Referral to [address] [place] [address] Kelby Nettles    Ambulatory referral to Neurology    Ambulatory referral to Psychiatry    CTA chest pe study            Subjective:      Patient ID: Pearl Collins is a 63 y o  male      Presents in office with brother   follow up multiple issues  And acute changes noted by this provider  change of mental status   weakness, frequent falls , intermittent confusion for the last 4 weeks , weight loss, personality and behavioral changes - urinating all over the house  Intermittent confusion     On exam - right sided weakness and mild deviation of the tongue to the right  Which is new to this provider  patient urinated in office while being examined   Increased headaches in the last few months   Brother states that patient sometimes would be staring at the walls and when asked what he is doing he is confused   Urinates in bed times and sometimes all over the house   No real rhyme or reason  Patient was initially recommended to go to the ER however refusing to go, brother requesting that work up is done and he will take him everywhere  He is aware that if something comes  Back abnormal he will have to go to the ER     Patient Active Problem List:     Claudication of right lower extremity ([address] )     Essential hypertension     Hypercholesteremia     History of DVT (deep vein thrombosis)     Chronic pain of multiple joints     Moderate persistent asthma without complication        The following portions of the patient's history were reviewed and updated as appropriate:   Past Medical History:  He has a past medical history of Arthritis, Cancer ([address] ), High blood pressure, Hyperlipidemia, and Hypertension  ,  _______________________________________________________________________  Medical Problems:  does not have any pertinent problems on file ,  _______________________________________________________________________  Past Surgical History:   has a past surgical history that includes Excisional hemorrhoidectomy and Other surgical history  ,  _______________________________________________________________________  Family History:  family history includes Hypertension in his mother; No Known Problems in his father ,  _______________________________________________________________________  Social History:   reports that he has been smoking cigarettes  He has a 30 00 pack-year smoking history  He has never used smokeless tobacco  He reports current alcohol use  He reports that he does not use drugs  ,  _______________________________________________________________________  Allergies:  has No Known Allergies     _______________________________________________________________________  Current Outpatient Medications   Medication Sig Dispense Refill    amLODIPine (NORVASC) 2 5 mg tablet Take 2 5 mg by mouth daily      amLODIPine (NORVASC) 5 mg tablet TAKE 1 TABLET (5 MG TOTAL) BY MOUTH DAILY 90 tablet 1    aspirin (ECOTRIN LOW STRENGTH) 81 mg EC tablet TAKE 1 TABLET (81 MG TOTAL) BY MOUTH DAILY 90 tablet 0    atorvastatin (LIPITOR) 40 mg tablet TAKE 1 TABLET (40 MG TOTAL) BY MOUTH DAILY AT BEDTIME 90 tablet 0    meloxicam (MOBIC) 15 mg tablet TAKE 1 TABLET (15 MG TOTAL) BY MOUTH DAILY PLEASE TAKE WITH FOOD 90 tablet 0     No current facility-administered medications for this visit       _______________________________________________________________________  Review of Systems   Constitutional: Positive for fatigue and unexpected weight change (weight loss)  Negative for chills and fever  HENT: Negative for congestion and sore throat  Eyes: Negative  Respiratory: Positive for cough and shortness of breath  Cardiovascular: Negative for chest pain and palpitations  Gastrointestinal: Negative for abdominal distention, nausea and vomiting  Genitourinary:        Incontinence of urine    Musculoskeletal: Positive for arthralgias and gait problem  Shuffled gait and weakness and frequent falls    Skin:        Dry skin    Neurological: Positive for weakness and headaches  Hematological: Negative for adenopathy     Psychiatric/Behavioral: Positive for agitation, behavioral problems, confusion, decreased concentration and sleep disturbance  The patient is nervous/anxious  Objective:  Vitals:    04/06/21 1001   BP: 138/70   BP Location: Left arm   Patient Position: Sitting   Cuff Size: Standard   Pulse: 84   Resp: 17   Temp: (!) 97 °F (36 1 °C)   TempSrc: Temporal   SpO2: 99%   Weight: 64 kg (141 lb)   Height: 5' 7" (1 702 m)     Body mass index is 22 08 kg/m²  Physical Exam  Vitals signs and nursing note reviewed  Constitutional:       Comments: To self only   Confused to time and place    HENT:      Head: Atraumatic  Eyes:      Extraocular Movements: Extraocular movements intact  Neck:      Musculoskeletal: Normal range of motion  Cardiovascular:      Rate and Rhythm: Normal rate and regular rhythm  Pulses: Normal pulses  Heart sounds: Normal heart sounds  Pulmonary:      Breath sounds: Rhonchi present  Comments: Decreased lung sounds   Shortness of breath   Skin:     General: Skin is dry  Neurological:      Mental Status: He is alert  He is disoriented  Sensory: Sensory deficit present  Motor: Weakness present        Coordination: Coordination abnormal       Gait: Gait abnormal       Deep Tendon Reflexes: Reflexes abnormal       Comments: Right tongue deviation , mild but noticeable and right side arm and leg weaker then left

## 2021-04-07 ENCOUNTER — HOSPITAL ENCOUNTER (OUTPATIENT)
Dept: CT IMAGING | Facility: HOSPITAL | Age: 66
Discharge: HOME | End: 2021-04-07
Payer: COMMERCIAL

## 2021-04-07 ENCOUNTER — TELEPHONE (OUTPATIENT)
Dept: FAMILY MEDICINE CLINIC | Facility: CLINIC | Age: 66
End: 2021-04-07

## 2021-04-07 DIAGNOSIS — R79.89 POSITIVE D DIMER: ICD-10-CM

## 2021-04-07 DIAGNOSIS — R06.02 SHORTNESS OF BREATH: ICD-10-CM

## 2021-04-07 LAB
B BURGDOR IGG+IGM SER-ACNC: 67
EST. AVERAGE GLUCOSE BLD GHB EST-MCNC: 120 MG/DL
HBA1C MFR BLD: 5.8 %
SARS-COV-2 RNA RESP QL NAA+PROBE: NEGATIVE

## 2021-04-07 PROCEDURE — 71275 CT ANGIOGRAPHY CHEST: CPT

## 2021-04-07 PROCEDURE — G1004 CDSM NDSC: HCPCS

## 2021-04-07 RX ADMIN — IOHEXOL 85 ML: 350 INJECTION, SOLUTION INTRAVENOUS at 14:02

## 2021-04-07 NOTE — TELEPHONE ENCOUNTER
[facility] pre encounter dept called - stat brain CT wo contrast ordered yesterday was not approved by insurance - [facility] is requesting a peer to peer review      Phone # for Ruper[address] Goel [address] [phone]  Tracking # [id number]

## 2021-04-07 NOTE — PATIENT INSTRUCTIONS
Altered Mental Status   WHAT YOU NEED TO KNOW:   Altered mental status (AMS) is a disruption in how your brain works that causes a change in behavior  This change can happen suddenly or over days  AMS ranges from slight confusion to total disorientation and increased sleepiness to coma  DISCHARGE INSTRUCTIONS:   Medicines:   · Antibiotics  help fight or prevent infection  Take your antibiotics until they are gone, even if you feel better  · Take your medicine as directed  Contact your healthcare provider if you think your medicine is not helping or if you have side effects  Tell him of her if you are allergic to any medicine  Keep a list of the medicines, vitamins, and herbs you take  Include the amounts, and when and why you take them  Bring the list or the pill bottles to follow-up visits  Carry your medicine list with you in case of an emergency  Follow up with your healthcare provider as directed:  Write down your questions so you remember to ask them during your visits  Contact your healthcare provider if:   · You have sudden changes in behavior  · You are more sleepy or confused than usual      · You have questions or concerns about your condition or care  Seek care immediately or call 911 if:   · Your speech is slurred or you are rambling  · You have a seizure  · You are not able to move any part of your body freely  · Someone close to you cannot wake you up  © Copyright [address] Information is for End User's use only and may not be sold, redistributed or otherwise used for commercial purposes  All illustrations and images included in CareNotes® are the copyrighted property of A D A M , Inc  or [facility] Cecy Xie   The above information is an  only  It is not intended as medical advice for individual conditions or treatments  Talk to your doctor, nurse or pharmacist before following any medical regimen to see if it is safe and effective for you

## 2021-04-08 DIAGNOSIS — J45.20 MILD INTERMITTENT ASTHMA, UNSPECIFIED WHETHER COMPLICATED: ICD-10-CM

## 2021-04-08 DIAGNOSIS — J41.0 SIMPLE CHRONIC BRONCHITIS (HCC): ICD-10-CM

## 2021-04-08 LAB — RPR SER QL: NORMAL

## 2021-04-14 ENCOUNTER — TELEPHONE (OUTPATIENT)
Dept: UROLOGY | Facility: AMBULATORY SURGERY CENTER | Age: 66
End: 2021-04-14

## 2021-04-14 ENCOUNTER — OFFICE VISIT (OUTPATIENT)
Dept: FAMILY MEDICINE CLINIC | Facility: CLINIC | Age: 66
End: 2021-04-14
Payer: COMMERCIAL

## 2021-04-14 VITALS
TEMPERATURE: 96.6 F | SYSTOLIC BLOOD PRESSURE: 116 MMHG | BODY MASS INDEX: 22.6 KG/M2 | HEIGHT: 67 IN | HEART RATE: 72 BPM | RESPIRATION RATE: 16 BRPM | WEIGHT: 144 LBS | DIASTOLIC BLOOD PRESSURE: 76 MMHG

## 2021-04-14 DIAGNOSIS — R46.89 COGNITIVE AND BEHAVIORAL CHANGES: ICD-10-CM

## 2021-04-14 DIAGNOSIS — E78.00 HYPERCHOLESTEREMIA: ICD-10-CM

## 2021-04-14 DIAGNOSIS — J45.40 MODERATE PERSISTENT ASTHMA WITHOUT COMPLICATION: Primary | ICD-10-CM

## 2021-04-14 DIAGNOSIS — F41.8 DEPRESSION WITH ANXIETY: ICD-10-CM

## 2021-04-14 DIAGNOSIS — Z00.00 ANNUAL PHYSICAL EXAM: ICD-10-CM

## 2021-04-14 DIAGNOSIS — G89.29 CHRONIC PAIN OF MULTIPLE JOINTS: ICD-10-CM

## 2021-04-14 DIAGNOSIS — I10 ESSENTIAL HYPERTENSION: ICD-10-CM

## 2021-04-14 DIAGNOSIS — R32 URINARY INCONTINENCE, UNSPECIFIED TYPE: ICD-10-CM

## 2021-04-14 DIAGNOSIS — M25.50 CHRONIC PAIN OF MULTIPLE JOINTS: ICD-10-CM

## 2021-04-14 DIAGNOSIS — J42 CHRONIC BRONCHITIS, UNSPECIFIED CHRONIC BRONCHITIS TYPE (HCC): ICD-10-CM

## 2021-04-14 DIAGNOSIS — R41.89 COGNITIVE DECLINE: ICD-10-CM

## 2021-04-14 DIAGNOSIS — I73.9 CLAUDICATION OF RIGHT LOWER EXTREMITY (HCC): ICD-10-CM

## 2021-04-14 DIAGNOSIS — R41.89 COGNITIVE AND BEHAVIORAL CHANGES: ICD-10-CM

## 2021-04-14 PROCEDURE — 4004F PT TOBACCO SCREEN RCVD TLK: CPT | Performed by: NURSE PRACTITIONER

## 2021-04-14 PROCEDURE — 3078F DIAST BP <80 MM HG: CPT | Performed by: NURSE PRACTITIONER

## 2021-04-14 PROCEDURE — 3725F SCREEN DEPRESSION PERFORMED: CPT | Performed by: NURSE PRACTITIONER

## 2021-04-14 PROCEDURE — 99396 PREV VISIT EST AGE 40-64: CPT | Performed by: NURSE PRACTITIONER

## 2021-04-14 PROCEDURE — 3008F BODY MASS INDEX DOCD: CPT | Performed by: NURSE PRACTITIONER

## 2021-04-14 PROCEDURE — 3074F SYST BP LT 130 MM HG: CPT | Performed by: NURSE PRACTITIONER

## 2021-04-14 RX ORDER — CEPHALEXIN 500 MG/1
500 CAPSULE ORAL EVERY 12 HOURS SCHEDULED
Qty: 10 CAPSULE | Refills: 0 | Status: SHIPPED | OUTPATIENT
Start: 2021-04-14 | End: 2021-04-19

## 2021-04-14 RX ORDER — CITALOPRAM 10 MG/1
10 TABLET ORAL DAILY
Qty: 30 TABLET | Refills: 1 | Status: SHIPPED | OUTPATIENT
Start: 2021-04-14 | End: 2021-05-07

## 2021-04-14 NOTE — TELEPHONE ENCOUNTER
[facility] to offer OV for R32 (ICD-10-CM) - Urinary incontinence, unspecified type referred by PCP  Provided # for return call  Schedule Patient when call is returned-no OV held

## 2021-04-16 ENCOUNTER — TELEPHONE (OUTPATIENT)
Dept: FAMILY MEDICINE CLINIC | Facility: CLINIC | Age: 66
End: 2021-04-16

## 2021-04-16 NOTE — TELEPHONE ENCOUNTER
THAIS Vega pt had intake with [facility] today   They will be faxing over an order to be signed for OT to be done twice a week for 4 weeks

## 2021-04-18 NOTE — PROGRESS NOTES
ADULT ANNUAL PHYSICAL  [facility] CARE [place]    NAME: Carols Self  AGE: 63 y o  SEX: male  : 1955     DATE: 2021     Assessment and Plan:   Presents in office for follow up multiple issues and annual physical needed for caregaps  Accompanied by brother  States he is feeling much better     ICD-10-CM PL 1  Annual physical exam   Z00 00 Change Dx 2  Moderate persistent asthma without complication   S05 74 Change Dx 3  Essential hypertension   I10 Change Dx 4  Chronic pain of multiple joints   M25 50     Change Dx 5  Claudication of right lower extremity (HCC)   I73 9 Change Dx 6  Hypercholesteremia   E78 00 Change Dx 7  Depression with anxiety   F41 8 Change Dx 8  Urinary incontinence, unspecified type   R32 Change Dx 9  Chronic bronchitis, unspecified chronic bronchitis type ([address] )   J42 Change Dx 10  Cognitive and behavioral changes   R41 89     Change Dx 11   Cognitive decline   R41 89 Change Dx    Cognitive changes / decline could be related to a neurological issue vs depression - discussed adding treatment for depression for now follow up 4-6 weeks   He is to follow up with Neurology   Discussed better management of Asthma and follow up with PULM to make sure he is not having any respiratory issues     Problem List Items Addressed This Visit        Respiratory    Moderate persistent asthma without complication - Primary    Relevant Medications    fluticasone-salmeterol (ADVAIR, [address]) 250-50 mcg/dose inhaler       Cardiovascular and Mediastinum    Essential hypertension       Other    Claudication of right lower extremity ([address] )    Hypercholesteremia    Chronic pain of multiple joints      Other Visit Diagnoses     Annual physical exam        Depression with anxiety        Relevant Medications    citalopram (CeleXA) 10 mg tablet    Urinary incontinence, unspecified type        Relevant Medications    cephalexin (KEFLEX) 500 mg capsule    Other Relevant Orders    Ambulatory referral to Urology    Chronic bronchitis, unspecified chronic bronchitis type ([facility] )        Relevant Medications    fluticasone-salmeterol ([address]) 250-50 mcg/dose inhaler    Other Relevant Orders    Ambulatory referral to Pulmonology    Cognitive and behavioral changes        Cognitive decline              Immunizations and preventive care screenings were discussed with patient today  Appropriate education was printed on patient's after visit summary  Counseling:  Alcohol/drug use: discussed moderation in alcohol intake, the recommendations for healthy alcohol use, and avoidance of illicit drug use  Dental Health: discussed importance of regular tooth brushing, flossing, and dental visits  Injury prevention: discussed safety/seat belts, safety helmets, smoke detectors, carbon dioxide detectors, and smoking near bedding or upholstery  Sexual health: discussed sexually transmitted diseases, partner selection, use of condoms, avoidance of unintended pregnancy, and contraceptive alternatives  · Exercise: the importance of regular exercise/physical activity was discussed  Recommend exercise 3-5 times per week for at least 30 minutes  Return in 4 weeks (on 5/12/2021)  Chief Complaint:     Chief Complaint   Patient presents with    Follow-up      History of Present Illness:     Adult Annual Physical   Patient here for a comprehensive physical exam  The patient reports problems - cognitive decline weakness and incontinence of urine   Diet and Physical Activity  · Diet/Nutrition: poor diet  · Exercise: no formal exercise  Depression Screening  PHQ-9 Depression Screening    PHQ-9:   Frequency of the following problems over the past two weeks:      Little interest or pleasure in doing things: 0 - not at all  Feeling down, depressed, or hopeless: 0 - not at all  PHQ-2 Score: 0       General Health  · Sleep: sleeps poorly     · Hearing: normal - bilateral   · Vision: no vision problems  · Dental: does not floss   Health  · Symptoms include: urinary frequency     Review of Systems:     Review of Systems   Constitutional: Positive for fatigue and unexpected weight change (weight loss)  Negative for chills and fever  HENT: Negative for congestion and sore throat  Eyes: Negative  Respiratory: Positive for cough, shortness of breath and wheezing  Cardiovascular: Negative for chest pain and palpitations  Gastrointestinal: Negative for abdominal distention, nausea and vomiting  Genitourinary:        Incontinence of urine    Musculoskeletal: Positive for arthralgias and gait problem  Shuffled gait and weakness and frequent falls    Skin:        Dry skin    Neurological: Positive for weakness and headaches  Hematological: Negative for adenopathy  Psychiatric/Behavioral: Positive for confusion, decreased concentration and sleep disturbance  Negative for agitation and behavioral problems  The patient is nervous/anxious         Past Medical History:     Past Medical History:   Diagnosis Date    Arthritis     [facility]     High blood pressure     Hyperlipidemia     Hypertension       Past Surgical History:     Past Surgical History:   Procedure Laterality Date    EXCISIONAL HEMORRHOIDECTOMY      OTHER SURGICAL HISTORY      stent       Family History:     Family History   Problem Relation Age of Onset    Hypertension Mother     No Known Problems Father       Social History:        Social History     Socioeconomic History    Marital status: Single     Spouse name: None    Number of children: None    Years of education: None    Highest education level: None   Occupational History    None   Social Needs    Financial resource strain: None    Food insecurity     Worry: None     Inability: None    Transportation needs     Medical: None     Non-medical: None   Tobacco Use    Smoking status: Current Every Day Smoker     Packs/day: 1 00     Years: 30 00     Pack years: 30 00     Types: Cigarettes    Smokeless tobacco: Never Used   Substance and Sexual Activity    Alcohol use: Yes     Frequency: 2-4 times a month     Comment: Occasional     Drug use: Never    Sexual activity: None   Lifestyle    Physical activity     Days per week: None     Minutes per session: None    Stress: None   Relationships    Social connections     Talks on phone: None     Gets together: None     Attends Latter day service: None     Active member of club or organization: None     Attends meetings of clubs or organizations: None     Relationship status: None    Intimate partner violence     Fear of current or ex partner: None     Emotionally abused: None     Physically abused: None     Forced sexual activity: None   Other Topics Concern    None   Social History Narrative    · Most recent tobacco use screenin2020     · Do you currently or have you served in the [employer]:   No      · Alcohol intake:   Occasional      · Are you currently employed:   No      · Occupational health risks:No history of asbestos exposure       - As per [employer]       Current Medications:     Current Outpatient Medications   Medication Sig Dispense Refill    amLODIPine (NORVASC) 5 mg tablet TAKE 1 TABLET (5 MG TOTAL) BY MOUTH DAILY 90 tablet 1    atorvastatin (LIPITOR) 40 mg tablet TAKE 1 TABLET (40 MG TOTAL) BY MOUTH DAILY AT BEDTIME 90 tablet 0    meloxicam (MOBIC) 15 mg tablet TAKE 1 TABLET (15 MG TOTAL) BY MOUTH DAILY PLEASE TAKE WITH FOOD 90 tablet 0    amLODIPine (NORVASC) 2 5 mg tablet Take 2 5 mg by mouth daily      aspirin (ECOTRIN LOW STRENGTH) 81 mg EC tablet TAKE 1 TABLET (81 MG TOTAL) BY MOUTH DAILY 90 tablet 0    cephalexin (KEFLEX) 500 mg capsule Take 1 capsule (500 mg total) by mouth every 12 (twelve) hours for 5 days 10 capsule 0    citalopram (CeleXA) 10 mg tablet Take 1 tablet (10 mg total) by mouth daily 30 tablet 1    fluticasone-salmeterol (Yun Pottier) 250-50 mcg/dose inhaler Inhale 1 puff 2 (two) times a day Rinse mouth after use       No current facility-administered medications for this visit  Allergies:     No Known Allergies   Physical Exam:     /76 (BP Location: Right arm, Patient Position: Sitting, Cuff Size: Adult)   Pulse 72   Temp (!) 96 6 °F (35 9 °C) (Tympanic Core)   Resp 16   Ht 5' 7" (1 702 m)   Wt 65 3 kg (144 lb)   BMI 22 55 kg/m²     Physical Exam  Vitals signs and nursing note reviewed  HENT:      Mouth/Throat:      Mouth: Mucous membranes are dry  Eyes:      Extraocular Movements: Extraocular movements intact  Cardiovascular:      Rate and Rhythm: Normal rate and regular rhythm  Heart sounds: Normal heart sounds  Pulmonary:      Breath sounds: Wheezing present  Abdominal:      Palpations: Abdomen is soft  Skin:     General: Skin is warm  Neurological:      Mental Status: He is alert  Motor: Weakness present  Coordination: Coordination abnormal       Novel Coronavirus (Covid-19),PCR [facility] - Collected at [facility] or [facility]  Order: [id number]  Status:  Final result   Visible to patient:  No (inaccessible in [address])   Next appt:  05/05/2021 at 01:40 PM in Pulmonology Elnior Fraga MD)   Dx:  Viral infection, unspecified  Specimen Information: Nasopharyngeal Swab         Ref Range & Units    SARS-CoV-2 Negative Negative       Narrative          RPR  Order: [id number]  Status:  Final result   Visible to patient:  No (inaccessible in [address])   Next appt:  05/05/2021 at 01:40 PM in Pulmonology Elinor Fraga MD)   Dx:  Confusion; Agitated; Memory deficit;        Ref Range & Units 4/6/21 11:30 AM   RPR Non-Reactive Non-Reactive          Specimen Collected: 04/06/21 11:30 AM   Last Resulted: 04/08/21 12:41 PM           Contains abnormal data HEMOGLOBIN A1C W/ EAG ESTIMATION  Order: [id number]  Status:  Final result   Visible to patient:  No (inaccessible in [address]) Next appt:  05/05/2021 at 01:40 PM in Pulmonology Padmini Mckinney MD)   Dx:  Confusion; Agitated; Weight loss; Mem    Ref Range & Units 4/6/21 11:30 AM   Hemoglobin A1C Normal 3 8-5 6%; PreDiabetic 5 7-6 4%; Diabetic >=6 5%; Glycemic control for adults with diabetes <7 0% % 5 8High     EAG mg/dl 120          Specimen Collected: 04/06/21 11:30 AM   Last Resulted: 04/07/21  4:20 AM           Contains abnormal data Urine Microscopic  Order: [id number] - Reflex for Order [id number]  Status:  Final result   Visible to patient:  No (inaccessible in [address])   Next appt:  05/05/2021 at 01:40 PM in Pulmonology Padmini Mckinney MD)   Dx:  Cognitive and behavioral changes; Con    Ref Range & Units 4/6/21 11:30 AM   RBC, UA None Seen, 0-1, 1-2, 2-4, 0-5 /hpf None Seen    WBC, UA None Seen, 0-1, 1-2, 0-5, 2-4 /hpf None Seen    Epithelial Cells None Seen, Occasional /hpf Occasional    Bacteria, UA None Seen, Occasional /hpf Occasional    MUCUS THREADS None Seen InnumerableAbnormal           Specimen Collected: 04/06/21 11:30 AM   Last Resulted: 04/06/21  1:45 PM           Contains abnormal data UA (URINE) with reflex to Scope  Order: [id number]  Status:  Final result   Visible to patient:  No (inaccessible in [address])   Next appt:  05/05/2021 at 01:40 PM in Pulmonology Padmini Mckinney MD)   Dx:  Cognitive and behavioral changes; Con       Ref Range & Units 4/6/21 11:30 AM   Color, UA Yellow Yellow    Clarity, UA Clear Slightly CloudyAbnormal     Specific Gravity, UA 1 001 - 1 030 >=1 030    pH, UA 5 0, 5 5, 6 0, 6 5, 7 0, 7 5, 8 0 6 0    Leukocytes, UA Negative Negative    Nitrite, UA Negative Negative    Protein, UA Negative, Interference- unable to analyze mg/dl TraceAbnormal     Glucose, UA Negative mg/dl Negative    Ketones, UA Negative mg/dl Negative    Urobilinogen, UA 0 2, 1 0 E U /dl E U /dl 1 0    Bilirubin, UA Negative 1+Abnormal     Blood, UA Negative Negative          Specimen Collected: 04/06/21 11:30 AM   Last Resulted: 04/06/21  1:03 PM              B-Type Natriuretic Peptide ([address])  Order: [id number]  Status:  Final result   Visible to patient:  No (inaccessible in [address])   Next appt:  05/05/2021 at 01:40 PM in Pulmonology Aileen Stokes MD)   Dx:  Personal history of other lymphatic a  Ref Range & Units 4/6/21 11:30 AM   BNP 1 - 100 pg/mL 20 3          Specimen Collected: 04/06/21 11:30 AM   Last Resulted: 04/06/21 12:28 PM              Ammonia  Order: [id number]  Status:  Final result   Visible to patient:  No (inaccessible in [address])   Next appt:  05/05/2021 at 01:40 PM in [facility] Aileen Stokes MD)   Dx:  Confusion; Agitated; Memory deficit   Ref Range & Units 4/6/21 11:30 AM   Ammonia <=36 umol/L 27 45    Comment: Specimen collection should occur prior to Sulfapyridine administration due to the potential for falsely depressed results  Specimen Collected: 04/06/21 11:30 AM   Last Resulted: 04/06/21 12:16 PM        Lab Flowsheet     Order Details           Hepatitis panel, acute  Order: [id number]  Status:  Final result   Visible to patient:  No (inaccessible in [address])   [facility]  [facility]   Dx:  Confusion; Agitated; Personality hua    Ref Range & Units 4/6/21 11:30 AM   Hepatitis B Surface Ag Non-reactive, NonReactive - Confirmed Non-reactive    Hep A IgM Non-reactive, Equivocal-Suggest Recollect Non-reactive    Hepatitis C Ab Non-reactive Non-reactive    Hep B C IgM Non-reactive Non-reactive          Specimen Collected: 04/06/21 11:30 AM   Last Resulted: 04/06/21  5:23 PM            Lyme Antibody Profile with reflex to WB  Order: [id number]  Status:  Final result   Visible to patient:  No (inaccessible in [address])   Next appt:  05/05/2021 [facility])   Dx:  Confusion; Agitated; Personality hua       Ref Range & Units 4/6/21 11:30 AM   Lyme total antibody 0 00 - 119 67    Comment: Negative (0-119) Absence of detectable Borrelia burgdoferi Antibodies  A negative result does not exclude the possibility of Borrelia infection  If early Lyme disease is suspected,a second sample should be collected & tested 4 weeks after initial testing  Specimen Collected: 04/06/21 11:30 AM   Last Resulted: 04/07/21  2:51 AM            Troponin I  Order: [id number]  Status:  Final result   Visible to patient:  No (inaccessible in [address])   Next appt:  05/05/2021 at 01:40 PM in Pulmonology Mj Isidro MD)   Dx:  Confusion; Agitated; Personality hua    Ref Range & Units 4/6/21 11:30 AM   Troponin I 0 00 - 0 07 ng/mL <0 03    Comment: Chad's Chemistry analyzer 99% cutoff is > 0 03ng/mL     o cTnl 99% cutoff is useful only when applied to patients in the clinical setting of myocardial ischemia   o cTnl 99% cutoff should be interpreted in the context of clinical history, ECG findings and possibly cardiac imaging to establish correct diagnosis  o cTnl 99% cutoff may be suggestive but clearly not indicative of a coronary event without the clinical setting of myocardial ischemia  Specimen Collected: 04/06/21 11:30 AM   Last Resulted: 04/06/21 12:33 PM         Contains abnormal data D-dimer, quantitative  Order: [id number]  Status:  Final result   Visible to patient:  No (inaccessible in [address])   Next appt:  05/05/2021 at 01:40 PM in Pulmonology Mj Isidro MD)   Dx:  Confusion; Agitated; Personality hua    Ref Range & Units 4/6/21 11:30 AM   D-Dimer, Quant  0 19 - 0 49 mg/L FEU 1  11High     Comment: Reference and upper limits to exclude DVT and PE are the same   Do not use to exclude if clinical symptoms are present           Specimen Collected: 04/06/21 11:30 AM   Last Resulted: 04/06/21 12:10 PM        Lab Flowsheet     Order Details           Contains abnormal data Lipid panel  Order: [id number]  Status:  Final result   Visible to patient:  No (inaccessible [address]   Next appt:  05/05/2021 at 01:40 PM in Pulmonology Ashlie Ramirez MD)   Dx:  Confusion; Agitated; Personality hua    Ref Range & Units 4/6/21 11:30 AM   Cholesterol <=200 mg/dL 150    Comment: Cholesterol:       Desirable         <200 mg/dl       Borderline         200-239 mg/dl       High              >239          Triglycerides <=150 mg/dL 111 7    Comment: Triglyceride:      Normal          <150 mg/dl      Borderline High 150-199 mg/dl      High            200-499 mg/dl        Very High       >499 mg/dl     Specimen collection should occur prior to N-Acetylcysteine or Metamizole administration due to the potential for falsely depressed results  HDL, Direct >=40 mg/dL 32Low     Comment: HDL Cholesterol:       Low     <41 mg/dL   LDL Calculated 0 - 100 mg/dL 96    Comment: LDL Cholesterol:     Optimal           <100 mg/dl     Near Optimal      100-129 mg/dl     Above Optimal       Borderline High 130-159 mg/dl       High            160-189 mg/dl       Very High       >189 mg/dl              TSH, 3rd generation  Order: [id number]  Status:  Final result   Visible to patient:  No (inaccessible in [facility])   Next appt:  05/05/2021 at 01:40 PM in Pulmonology Ashlie Ramirez MD)   Dx:  Confusion; Agitated; Personality hua    Ref Range & Units 4/6/21 11:30 AM   TSH 3RD GENERATON 0 340 - 5 600 uIU/mL 1 320       Narrative    Patients undergoing fluorescein dye angiography may retain small amounts of fluorescein in the body for 48-72 hours post procedure  Samples containing fluorescein can produce falsely depressed TSH values  If the patient had this procedure,a specimen should be resubmitted post fluorescein clearance         Specimen Collected: 04/06/21 11:30 AM           CBC and differential  Order: [id number]  Status:  Final result   Visible to patient:  No (inaccessible in [address])   Next appt:  05/05/2021 at 01:40 PM in Pulmonology Clair Mcgarry MD)   Dx:  Confusion; Agitated; Personality hua    Ref Range & Units 4/6/21 11:30 AM   WBC 4 31 - 10 16 Thousand/uL 6 50    RBC 3 88 - 5 62 Million/uL 5 05    Hemoglobin 12 0 - 17 0 g/dL 15 6    Hematocrit 36 5 - 49 3 % 45 3    MCV 82 - 98 fL 90    MCH 26 8 - 34 3 pg 30 9    MCHC 31 4 - 37 4 g/dL 34 4    RDW 11 6 - 15 1 % 13 4    MPV 8 9 - 12 7 fL 11 4    Platelets 805 - 821 Thousands/uL 218    Neutrophils Relative 43 - 75 % 59    Immat GRANS % 0 - 2 % 0    Lymphocytes Relative 14 - 44 % 30    Monocytes Relative 4 - 12 % 7    Eosinophils Relative 0 - 6 % 3    Basophils Relative 0 - 1 % 1    Neutrophils Absolute 1 85 - 7 62 Thousands/µL 3 88    Immature Grans Absolute 0 00 - 0 20 Thousand/uL 0 01    Lymphocytes Absolute 0 60 - 4 47 Thousands/µL 1 97    Monocytes Absolute 0 17 - 1 22 Thousand/µL 0 43    Eosinophils Absolute 0 00 - 0 61 Thousand/µL 0 18    Basophils Absolute 0 00 - 0 10 Thousands/µL 0 03          Specimen Collected: 04/06/21 11:30 AM   Last Resulted: 04/06/21 12:16 PM              Contains abnormal data Comprehensive metabolic panel  Order: [id number]  Status:  Final result   Visible to patient:  No (inaccessible in [facility])   Next appt:  05/05/2021 at 01:40 PM in Pulmonology Clair Mcgarry MD)   Dx:  Confusion; Agitated; Personality hua    Ref Range & Units 4/6/21 11:30 AM   Sodium 133 - 145 mmol/L 142    Potassium 3 5 - 5 0 mmol/L 3 7    Chloride 96 - 108 mmol/L 105    CO2 22 - 33 mmol/L 29    ANION GAP 4 - 13 mmol/L 8    BUN 6 - 20 mg/dL 14    Creatinine 0 50 - 1 20 mg/dL 1 09    Comment: Standardized to IDMS reference method   Glucose, Fasting 70 - 105 mg/dL 101    Comment: Specimen collection should occur prior to Sulfasalazine administration due to the potential for falsely depressed results  Specimen collection should occur prior to Sulfapyridine administration due to the potential for falsely elevated results  Calcium 8 4 - 10 2 mg/dL 10  6High     AST 15 - 41 U/L 18    Comment: Specimen collection should occur prior to Sulfasalazine administration due to the potential for falsely depressed results  ALT 5 - 63 U/L 14    Comment: Specimen collection should occur prior to Sulfasalazine administration due to the potential for falsely depressed results  Alkaline Phosphatase 10 - 129 U/L 57 8    Total Protein 6 4 - 8 3 g/dL 8 5High     Albumin 3 4 - 4 8 g/dL 5 2High     Total Bilirubin 0 30 - 1 20 mg/dL 0 37    eGFR ml/min/1 73sq m 72            PACS Images     Show images for CTA chest pe study   Study Result    CTA - CHEST WITH IV CONTRAST - PULMONARY ANGIOGRAM     INDICATION:   R79 89: Other specified abnormal findings of blood chemistry  R06 02: Shortness of breath      COMPARISON: None      TECHNIQUE: CTA examination of the chest was performed using angiographic technique according to a protocol specifically tailored to evaluate for pulmonary embolism  Axial, sagittal, and coronal 2D reformatted images were created from the source data and   submitted for interpretation  In addition, coronal 3D MIP postprocessing was performed on the acquisition scanner        Radiation dose length product (DLP) for this visit:  263 1 mGy-cm   This examination, like all CT scans performed in the [facility], was performed utilizing techniques to minimize radiation dose exposure, including the use of iterative   reconstruction and automated exposure control      IV Contrast:  85 mL of iohexol (OMNIPAQUE)     FINDINGS:     PULMONARY ARTERIAL TREE:  No pulmonary embolus is seen       LUNGS:  No focal airspace consolidation    There is no tracheal or endobronchial lesion      PLEURA:  Unremarkable      HEART/GREAT VESSELS:  Unremarkable for patient's age      MEDIASTINUM AND LAURE:  Unremarkable      CHEST WALL AND LOWER NECK:   Unremarkable      VISUALIZED STRUCTURES IN THE UPPER ABDOMEN:  Unremarkable      OSSEOUS STRUCTURES:  No acute fracture or destructive osseous lesion      IMPRESSION:     No evidence for pulmonary embolism                  Workstation performed: [id number]      Imaging    CTA chest pe study (Order: [id number]) - 4/7/2021  PACS Images     Show images for CT head wo contrast   Study Result    CT BRAIN - WITHOUT CONTRAST     INDICATION:   R41 89: Other symptoms and signs involving cognitive functions and awareness  R46 89: Other symptoms and signs involving appearance and behavior  R41 0: Disorientation, unspecified  R41 3: Other amnesia  R45 1: Restlessness and agitation  F68 8: Other specified disorders of adult personality and behavior  Z87 898: Personal history of other specified conditions  N39 46: Mixed incontinence      COMPARISON:  None      TECHNIQUE:  CT examination of the brain was performed  In addition to axial images, sagittal and coronal 2D reformatted images were created and submitted for interpretation      Radiation dose length product (DLP) for this visit:  800 mGy-cm   This examination, like all CT scans performed in the [facility], was performed utilizing techniques to minimize radiation dose exposure, including the use of iterative   reconstruction and automated exposure control        IMAGE QUALITY:  Diagnostic      FINDINGS:     PARENCHYMA: Decreased attenuation is noted in periventricular and subcortical white matter demonstrating an appearance that is statistically most likely to represent mild microangiopathic change      No CT signs of acute infarction  No intracranial mass, mass effect or midline shift  No acute parenchymal hemorrhage  Bilateral periventricular white matter chronic lacunar infarcts are present    Focus of encephalomalacia is seen within the left   occipital lobe      VENTRICLES AND EXTRA-AXIAL SPACES:  Normal for the patient's age      VISUALIZED ORBITS AND PARANASAL SINUSES:  Unremarkable      CALVARIUM AND EXTRACRANIAL SOFT TISSUES: Normal      IMPRESSION:     No acute intracranial abnormality                  Workstation performed: [id number]      Imaging    CT head wo contrast (Order: [id number]) - 4/6/2021              Luis Antonio Nguyen, [address] PRIMARY [facility] [place]

## 2021-04-18 NOTE — PATIENT INSTRUCTIONS

## 2021-04-19 DIAGNOSIS — R32 URINARY INCONTINENCE, UNSPECIFIED TYPE: ICD-10-CM

## 2021-04-19 RX ORDER — CEPHALEXIN 500 MG/1
500 CAPSULE ORAL EVERY 12 HOURS SCHEDULED
Qty: 10 CAPSULE | Refills: 0 | OUTPATIENT
Start: 2021-04-19 | End: 2021-04-24

## 2021-04-22 NOTE — TELEPHONE ENCOUNTER
Patient had called and left  that he needed refill of Meloxicam  He has enough supply that will last him until 6/22/21 per the pharmacist at [facility]  Patient was then called back and made aware  Told him to call the office if any further issues

## 2021-04-26 ENCOUNTER — TELEPHONE (OUTPATIENT)
Dept: FAMILY MEDICINE CLINIC | Facility: CLINIC | Age: 66
End: 2021-04-26

## 2021-04-26 NOTE — TELEPHONE ENCOUNTER
Spoke to the patient's brother David Briggs, informed him that the papers were signed and sent it  He received a call this morning from the agency  He says thank you!

## 2021-04-26 NOTE — TELEPHONE ENCOUNTER
Please let brother know that I have signed all the papers from the home care agency and faxed on  friday please   I didn't get a chance to call him

## 2021-05-03 DIAGNOSIS — J42 CHRONIC BRONCHITIS, UNSPECIFIED CHRONIC BRONCHITIS TYPE (HCC): Primary | ICD-10-CM

## 2021-05-05 ENCOUNTER — TELEPHONE (OUTPATIENT)
Dept: FAMILY MEDICINE CLINIC | Facility: CLINIC | Age: 66
End: 2021-05-05

## 2021-05-05 NOTE — TELEPHONE ENCOUNTER
Judith Hernandez from [facility] called - pt is being released from speech therapist services - he missed a few days of meds but is back on them

## 2021-05-05 NOTE — TELEPHONE ENCOUNTER
[facility] called and LMOM, stating that pt has been discharged as of today form home nursing care, as FYI

## 2021-05-07 ENCOUNTER — TELEPHONE (OUTPATIENT)
Dept: FAMILY MEDICINE CLINIC | Facility: CLINIC | Age: 66
End: 2021-05-07

## 2021-05-07 ENCOUNTER — TELEPHONE (OUTPATIENT)
Dept: NEUROLOGY | Facility: CLINIC | Age: 66
End: 2021-05-07

## 2021-05-07 DIAGNOSIS — R15.9 FULL INCONTINENCE OF FECES: Primary | ICD-10-CM

## 2021-05-07 DIAGNOSIS — F41.8 DEPRESSION WITH ANXIETY: ICD-10-CM

## 2021-05-07 DIAGNOSIS — N39.46 MIXED STRESS AND URGE URINARY INCONTINENCE: ICD-10-CM

## 2021-05-07 RX ORDER — CITALOPRAM 10 MG/1
10 TABLET ORAL DAILY
Qty: 30 TABLET | Refills: 1 | Status: SHIPPED | OUTPATIENT
Start: 2021-05-07 | End: 2021-05-24 | Stop reason: SDUPTHER

## 2021-05-07 NOTE — TELEPHONE ENCOUNTER
Patient is regressing quiet a bit  incontinence of urine   Incontinence of stool   His brother is having a hard time with managing him   Called me yesterday - stated that he would like to have him placed in [place] by his mom but that did not work out     I will order Supplies for patient   He needs follow up with Psych   Needs follow up with Neurology   Urology and GI   Something is going on   CT scans were done and normal of the brain   Blood work was ok

## 2021-05-07 NOTE — TELEPHONE ENCOUNTER
This Tuesday has Neurology appt at 9 am   Urology 5/14/ at 11 am   Gastroenetrology   [place] 10 th 11 am   Also I sent his supplies for [email or web address]

## 2021-05-07 NOTE — TELEPHONE ENCOUNTER
Best contact number for patient:  [phone]    Emergency Contact name and number:  Brady Snowden     [phone]       Referring provider and telephone number:  Armanimanny Douglass  [phone]    Primary Care Provider Name and if affiliated with [facility]:   Armani Douglass - yes    Reason for Appointment/Dx:  Cognitive and behavioral changesConfusionMemory Deficit    Have you seen and followed up with a pediatric Neurologist for this disease in the past? No    Neurology Location patient would like to be seen: [facility] Gunter received? No  Records Received? No  Have you ever seen another Neurologist? [address]    ID/Policy #:    Secondary Insurance:    ID/Policy#: Workman's Comp/ Accident/ School  Information      Workman's Comp/Accident/School related?    No                 Appointment date:   05-  Dr [address] Parekh

## 2021-05-11 ENCOUNTER — TRANSCRIBE ORDERS (OUTPATIENT)
Dept: LAB | Facility: CLINIC | Age: 66
End: 2021-05-11

## 2021-05-11 ENCOUNTER — TELEPHONE (OUTPATIENT)
Dept: NEUROLOGY | Facility: CLINIC | Age: 66
End: 2021-05-11

## 2021-05-11 ENCOUNTER — CONSULT (OUTPATIENT)
Dept: NEUROLOGY | Facility: CLINIC | Age: 66
End: 2021-05-11
Payer: COMMERCIAL

## 2021-05-11 ENCOUNTER — APPOINTMENT (OUTPATIENT)
Dept: LAB | Facility: CLINIC | Age: 66
End: 2021-05-11
Payer: COMMERCIAL

## 2021-05-11 VITALS
BODY MASS INDEX: 21.96 KG/M2 | WEIGHT: 140.2 LBS | SYSTOLIC BLOOD PRESSURE: 130 MMHG | HEART RATE: 74 BPM | DIASTOLIC BLOOD PRESSURE: 72 MMHG

## 2021-05-11 DIAGNOSIS — R41.0 CONFUSION: ICD-10-CM

## 2021-05-11 DIAGNOSIS — Z87.898 HISTORY OF DRUG USE: ICD-10-CM

## 2021-05-11 DIAGNOSIS — Z86.73 HISTORY OF STROKE: ICD-10-CM

## 2021-05-11 DIAGNOSIS — R41.89 COGNITIVE AND BEHAVIORAL CHANGES: Primary | ICD-10-CM

## 2021-05-11 DIAGNOSIS — R45.1 AGITATED: ICD-10-CM

## 2021-05-11 DIAGNOSIS — R41.89 COGNITIVE AND BEHAVIORAL CHANGES: ICD-10-CM

## 2021-05-11 DIAGNOSIS — F68.8 PERSONALITY CHANGE: ICD-10-CM

## 2021-05-11 DIAGNOSIS — R46.89 COGNITIVE AND BEHAVIORAL CHANGES: Primary | ICD-10-CM

## 2021-05-11 DIAGNOSIS — R46.89 COGNITIVE AND BEHAVIORAL CHANGES: ICD-10-CM

## 2021-05-11 LAB
FOLATE SERPL-MCNC: 8.2 NG/ML (ref 3.1–17.5)
NT-PROBNP SERPL-MCNC: 64 PG/ML
VIT B12 SERPL-MCNC: 455 PG/ML (ref 100–900)

## 2021-05-11 PROCEDURE — 83918 ORGANIC ACIDS TOTAL QUANT: CPT

## 2021-05-11 PROCEDURE — 3075F SYST BP GE 130 - 139MM HG: CPT | Performed by: PSYCHIATRY & NEUROLOGY

## 2021-05-11 PROCEDURE — 82746 ASSAY OF FOLIC ACID SERUM: CPT

## 2021-05-11 PROCEDURE — 84207 ASSAY OF VITAMIN B-6: CPT

## 2021-05-11 PROCEDURE — 82607 VITAMIN B-12: CPT

## 2021-05-11 PROCEDURE — 36415 COLL VENOUS BLD VENIPUNCTURE: CPT

## 2021-05-11 PROCEDURE — 3078F DIAST BP <80 MM HG: CPT | Performed by: PSYCHIATRY & NEUROLOGY

## 2021-05-11 PROCEDURE — 99244 OFF/OP CNSLTJ NEW/EST MOD 40: CPT | Performed by: PSYCHIATRY & NEUROLOGY

## 2021-05-11 PROCEDURE — 83880 ASSAY OF NATRIURETIC PEPTIDE: CPT

## 2021-05-11 PROCEDURE — 84425 ASSAY OF VITAMIN B-1: CPT

## 2021-05-11 NOTE — PROGRESS NOTES
Patient ID: Kelly Pascal is a 63 y o  male  Assessment/Plan:    Cognitive and behavioral changes  Several years of gradual cognitive decline with recent acceleration over the last 5-6 months  Significant history of heavy alcohol use, tobacco use, and former cocaine use  Unclear when cognitive decline initially began but patient/brother are able to report that he was already declining and was unable to work before he had his stroke about 3-5 years ago  More recently, patient is now having urinary and stool incontinence, episodes of staring, reduced short term memory, as well as some concerning safety issues  Brother also reports that he has had some behavioral changes including agitation and uncooperativeness  Patient's PCP started workup  CT head was negative for intracranial pathology but did reveal some atrophy  RPR, ammonia, hepatitis panel, and Lyme panel were all negative  MOCA today 15/30 with adjustment for education level  Unfortunately no prior to compare  On exam, patient noted to be oriented only to place, month, and year  Increased tone also noted in all extremities without cogwheel rigidity  Differential remains broad at this time including intracranial pathology, nutrition/vitamin Deficiencies including Wernicke's, Alzheimer's, and Parkinsonism etiology  Plan:  · MRI of brain  · MRI of C-spine given new incontinence  · Referral to Geriatrics  · Referral to Neuropsychiatry for formal neurocognitive testing  · Labs: Vitamin B6, Vitamin Y44, Thiamine, Folic acid, Methylmalonic acid  · Routine EEG  · Follow up in 3-4 months    History of stroke  Reported history of stroke  Patient and his brother are unable to provide information regarding when this occurred, exam findings, or residual deficits  Patient's brother believes that he may have had a facial droop however he is not certain  Patient appears to not have any chronic deficits at present  He is on ASA and statin         Diagnoses and all orders for this visit:    Cognitive and behavioral changes  -     Ambulatory referral to Neurology  -     Vitamin B12; Future  -     Folate; Future  -     Vitamin B1, whole blood; Future  -     MRI brain with and without contrast; Future  -     Ambulatory referral to Neuropsychology; Future  -     EEG awake or drowsy routine; Future  -     Vitamin B6; Future  -     Ambulatory referral to Geriatrics; Future  -     MRI cervical spine with and without contrast; Future  -     Methylmalonic acid, serum; Future    History of stroke           Subjective:    HPI    I had the pleasure of seeing your patient, Dilshad Lizarraga, today in the Neurology clinic for initial evaluation  Patient was referred by KELLY Huitron for neurocognitive decline  Patient is accompanied by his brother, Rose Tran is a 63 y o  left-handed male smoker with history of prior drug use, CVA, HTN, hyperlipidemia, asthma, and DVT  Per patient and brother, he has a history of stroke about 3-5 years ago however they are unable to provide any additional information regarding symptomatology or residual deficits  Patient/brother report that Angelita Tran has had gradual cognitive decline over the past several years which has accelerated over the last 5-6 months  Over the last 3-5 years 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)  This was initially due to issues with alcohol and drugs and then was because of memory problems  At that time he was living with his daughter who is unfortunately not available to assist with history today  He moved in with his Brother in [place], [place] about 3-5 years ago  When he first moved in he was still not working  His brother would take [place] along with him to his job and would have [place] do odds and ends  Rose White was not comfortable leaving Peggye Marc home alone but he is unable to explain why this was the case   He was having some issues with memory at that point, needing reminders of where he put things, tasks for the day, etc  He was, however, able to go on his own to the store and manage his finances/ADLs  Over the last 5-6 months patient has had significant decline  Memory continues to be an issue  Has difficulty focusing and concentrating on tasks as well as conversations  Has had several safety concerns  Thania Moore will not allow Taqueria Grover to do the dishes out of fear he would accidentally cut himself on a knife  He also had a safety concern where his brother found him standing in the middle of the street  Has not wandered off and does not drive at present  Has not left stove on  He is able to prepare simple meals for himself  Thania Moore has also noticed that Taqueria Grover has been having staring episodes  These can last a varying amount of time from 5 min to over an hour  For example, the other day Taqueria Grover stood at the door for over an hour and stared out the window without moving or having any meaningful conversation  No personal or family history of seizures  Additionally, Taqueria Grover is now no longer able to perform any of his IADLs and needs significant encouragement to do ADLs  In particular, Thania Moore has had significant difficulty getting Taqueria Grover to shower  He will often need several reminders and when reminding him he will have agitation and mood swings  Tends to not be cooperative with ADLs  No specific aggression but will be stubborn and not cooperate if he does not want to do a specific task  When they recently went to the [address] [place] [address],[address] put approximately $15 into the machine for one load of laundry which is significantly more money than was needed  During the last 1 month, Taqueria Grover has also developed urinary and stool incontinence  Thania Moore first noticed this several weeks ago when Taqueria Grover was playing basketball with his grandson and was urinating on himself and did not realize it  He is urinating on himself more often than actually using the restroom proper  Now needing to wear depends   New stool incontinence as well  Has soiled numerous pairs of pants  Also noted to trail stool from his bedroom to the bathroom and was then found by his brother walking back and forth in the stool on the ground spreading it around  Patient's son has called and discussed these changes with his PCP who started the initial workup  Labs including RPR, hepatitis panel, Lyme panel, and ammonia were negative  CBC and metabolic panel were also unremarkable  He also had a CT head completed on 4/6/21  This scan was consistent with atrophy  There were no acute intracranial findings  Patient's brother is most concerned today about finding a diagnosis for Ayesha Haro as well as establishing supportive care  Not interested in nursing home but would like help with in-home care  He is willing to discuss with Geriatrics and social work  The following portions of the patient's history were reviewed and updated as appropriate: allergies, current medications, past family history, past medical history, past social history, past surgical history and problem list          Objective:    Blood pressure 130/72, pulse 74, weight 63 6 kg (140 lb 3 2 oz)  Physical Exam  Constitutional:       General: He is awake  Eyes:      Extraocular Movements: Extraocular movements intact  Pupils: Pupils are equal, round, and reactive to light  Neurological:      Mental Status: He is alert  Deep Tendon Reflexes: Strength normal       Reflex Scores:       Tricep reflexes are 2+ on the right side and 2+ on the left side  Bicep reflexes are 2+ on the right side and 2+ on the left side  Brachioradialis reflexes are 2+ on the right side and 2+ on the left side  Patellar reflexes are 2+ on the right side and 2+ on the left side  Achilles reflexes are 2+ on the right side and 2+ on the left side  Psychiatric:         Speech: Speech normal          Neurological Exam  Mental Status  Awake and alert  Recalls 0 of 3 objects immediately   At 5 minutes recalls 0 of 3 objects  Recalls 1 of 3 objects with prompting  Unable to copy figure  Clock drawing is abnormal  Speech is normal  Follows three-step commands  Language: No aphasia  Only able to name 6 "F" words  Unable to perform serial calculations  Attention and concentration: Able to repeat 5 digits forward and 3 digits backwards  Did not identify all "A" in a list of letters  Serial 7's - "100, 97, 93, 86"  Orientation: President - Gaby Ng, Date - May 2021, unable to tell the date  Day of the week - Tuesday  [address]     Cranial Nerves  CN II: Visual acuity is normal  Visual fields full to confrontation  CN III, IV, VI: Extraocular movements intact bilaterally  Pupils equal round and reactive to light bilaterally  CN V: Facial sensation is normal   CN VII: Full and symmetric facial movement  CN VIII: Hearing is normal   CN IX, X: Palate elevates symmetrically  CN XI: Shoulder shrug strength is normal   CN XII: Tongue midline without atrophy or fasciculations  Motor  Normal muscle bulk throughout  Increased muscle tone  No cogwheel  Strength is 5/5 throughout all four extremities  Sensory  Light touch is normal in upper and lower extremities  Proprioception is normal in upper and lower extremities  Reflexes                                           Right                      Left  Brachioradialis                    2+                         2+  Biceps                                 2+                         2+  Triceps                                2+                         2+  Patellar                                2+                         2+  Achilles                                2+                         2+    Coordination  Right: Finger-to-nose normal  Rapid alternating movement normal   Left: Finger-to-nose normal  Rapid alternating movement normal     Gait  Casual gait is normal including stance, stride, and arm swing          MOCA:  Highest Level of Education - 11th Grade  Visuospatial/Executive - 3/5  Naming - 1/3  Attention - 3/6  Language - 2/3  Abstraction - 1/2  Delayed Recall - 0/5 without cues, 1/5 with category cue, 2/5 with multiple choice  Orientation - 4/6 (oriented to place, month, year, and day of week)  Total - 14/30 + 1 for education = 15/30        ROS:    Review of Systems   Constitutional: Negative  Negative for appetite change and fever  HENT: Negative  Negative for hearing loss, tinnitus, trouble swallowing and voice change  Eyes: Negative  Negative for photophobia and pain  Respiratory: Negative  Negative for shortness of breath  Cardiovascular: Negative  Negative for palpitations  Gastrointestinal: Negative  Negative for nausea and vomiting  Endocrine: Negative  Negative for cold intolerance  Genitourinary: Negative  Negative for dysuria, frequency and urgency  Musculoskeletal: Negative  Negative for myalgias and neck pain  Skin: Negative  Negative for rash  Neurological: Negative  Negative for dizziness, tremors, seizures, syncope, facial asymmetry, speech difficulty, weakness, light-headedness, numbness and headaches  Memory problems   Hematological: Negative  Does not bruise/bleed easily  Psychiatric/Behavioral: Positive for agitation and confusion   Negative for hallucinations and sleep disturbance        ======    Thank you for allowing me to participate in the care of your patient, DO Leila Guerra [facility] Neurology Residency, PGY-1

## 2021-05-11 NOTE — ASSESSMENT & PLAN NOTE
Reported history of stroke  Patient and his brother are unable to provide information regarding when this occurred, exam findings, or residual deficits  Patient's brother believes that he may have had a facial droop however he is not certain  Patient appears to not have any chronic deficits at present  He is on ASA and statin

## 2021-05-11 NOTE — TELEPHONE ENCOUNTER
Pt seen in office today as new consult  Pt living with brother for about 3 yrs and overall decline in memory and caring for himself for at least 5 yrs  Prior drug and etoh history  Only suupport at this time is brother who is having issues with pt listening to him to try to help him with eating, dressing, toilteting, meals , meds etc   Pt is more argumentative  Referred pt for formal neurocogntive eval and geriatric assessment  Can you look for any available resources to pt and family for meals, nursing aide, support in the home?

## 2021-05-11 NOTE — ASSESSMENT & PLAN NOTE
Several years of gradual cognitive decline with recent acceleration over the last 5-6 months  Significant history of heavy alcohol use, tobacco use, and former cocaine use  Unclear when cognitive decline initially began but patient/brother are able to report that he was already declining and was unable to work before he had his stroke about 3-5 years ago  More recently, patient is now having urinary and stool incontinence, episodes of staring, reduced short term memory, as well as some concerning safety issues  Brother also reports that he has had some behavioral changes including agitation and uncooperativeness  Patient's PCP started workup  CT head was negative for intracranial pathology but did reveal some atrophy  RPR, ammonia, hepatitis panel, and Lyme panel were all negative  MOCA today 15/30 with adjustment for education level  Unfortunately no prior to compare  On exam, patient noted to be oriented only to place, month, and year  Increased tone also noted in all extremities without cogwheel rigidity  Differential remains broad at this time including intracranial pathology, nutrition/vitamin Deficiencies including Wernicke's, Alzheimer's, and Parkinsonism etiology       Plan:  · MRI of brain  · MRI of C-spine given new incontinence  · Referral to Geriatrics  · Referral to Neuropsychiatry for formal neurocognitive testing  · Labs: Vitamin B6, Vitamin G57, Thiamine, Folic acid, Methylmalonic acid  · Routine EEG  · Follow up in 3-4 months

## 2021-05-12 NOTE — TELEPHONE ENCOUNTER
MSW phoned patient's brother, Maria Luisa East, who is listed on the Communication Consent to discuss options of getting in home assistance  Wilburmora Liconas stated that they had started a process to get in home help, but that someone was supposed to come out on a Thursday, but no one came and he did not get a call about it  La Liconas stated that he lost the number so could not call anyone to follow-up on it  MSW inquired if patient had a long term care insurance plan, to which La Grayson stated no  MSW also inquired if patient is a [place], to which La Grayson stated no  MSW then discussed services available through the [address] [place] [address] level of care but services are provided in the home  MSW advised that patient would need to meet criteria based on his level of need and his finances  MSW advised that the application process takes about 3 months, but that if patient were to get approved, that he could receive in home aide assistance, transportation, medical equipment, and/or personal emergency response system  MSW also advised that services may be available from the [facility] on [address], but that there would not be as many benefits  MSW suggested a referral to the [address] [place] [address], and patient's brother was agreeable to same  MSW facilitated a conference call with the [address] [place] [address] at [phone] and spoke with [facility] who advised that there is already a case open on this patient  [employer] stated that patient has completed the medical assessment and has been deemed Nursing Facility Clinically Eligible (NFCE), and that the MA application was sent to the [facility] on 4/29/21 to determine his financial eligibility   [employer] stated that they no longer provide a time frame in which financial eligibility determinations will be made, but did advise patient's brother to be on the lookout for letters from the [address] [place] [address] as they will likely be requesting proof of patient's finances to help determine his financial eligibility  Chiquita Santo also provided the phone number to the [facility] [place] in case patient's brother wishes to reach out to them to see what documents will be needed,as patient's brother stated that they have not received an requests from the [facility] to date asking for proof of finances  MSW will allow patient's brother a few days to contact the [facility]  It was decided that MSW will follow-up with him early next week to see if he was able to get through to the [facility], and if not, MSW will assist patient's brother in contacting the [facility] Help number to check on status of financial eligibility determination  In regard to food/meals, patient's brother reports that both he and patient receive food stamps, and are aware of where the food marshall/soup roberto are in their area  MSW offered referral to [employer] Whe[employer] - patient's brother is agreeable to same  MSW explained that a representative from [employer] will need to determine their eligibility and if eligible, will provide cost of meals  MSW made referral to [employer] by calling [phone]  MSW left a message for Caridad Dominique and provided patient's demographic information  MSW also advised that patient's brother may also benefit from Meals on Wheels as well

## 2021-05-13 DIAGNOSIS — M25.50 CHRONIC PAIN OF MULTIPLE JOINTS: ICD-10-CM

## 2021-05-13 DIAGNOSIS — G89.29 CHRONIC PAIN OF MULTIPLE JOINTS: ICD-10-CM

## 2021-05-13 RX ORDER — MELOXICAM 15 MG/1
15 TABLET ORAL DAILY
Qty: 90 TABLET | Refills: 0 | Status: SHIPPED | OUTPATIENT
Start: 2021-05-13 | End: 2021-07-09

## 2021-05-13 NOTE — PROGRESS NOTES
5/14/2021      Chief Complaint   Patient presents with    Urinary Incontinence     Assessment and Plan    63 y o  male new patient    1  Urinary incontinence  2  Cognitive impairment following stroke  -  Currently patient drinks 5 cups of coffee per day  I recommended he decrease caffeine and other bladder irritants to minimize current urinary symptoms  - Will start pelvic floor physical therapy  - Recommend timed voiding every 2-3 hours  - Recommend avoidance of constipation   - Consider medication management however would not recommend anticholinergic medication due to cognitive impairment  - Will send out urine for culture given +leukocytes and current symptoms  - Consider cystoscopic evaluation  2  Prostate cancer screening  - No PSA on file  - Will obtain PSA in 1 week  - AUGUSTA unremarkable  - F/u in 3 months for symptom reassessment  History of Present Illness  Dilshad Lizarraga is a 63 y o  male here for new patient evaluation of urinary incontinence  Patient reports this began about 3 months ago  Patient has history of stroke and has had history of decline in cognitive function and memory as well as behavioral changes  Patient is accompanied by his son for office visit today who assist with providing history  Son reports the patient will often become incontinent without noticing  Patient has not been using pads or depends underwear  Patient denies any symptoms such as dysuria, fever, chills, hematuria, flank pain, weak urinary stream   Patient denies any history of urologic issues or  manipulation  He denies family history of  malignancy  Reports occasional alcohol use and is a smoker  Medical history includes HTN, HLD, hx of DVT, hx of stroke  PVR=24 mL  Urine dip trace leukocytes and trace protein  Negative blood or nitrites       AUA SYMPTOM SCORE      Most Recent Value   AUA SYMPTOM SCORE   How often have you had a sensation of not emptying your bladder completely after you finished urinating? 5   How often have you had to urinate again less than two hours after you finished urinating? 2   How often have you found you stopped and started again several times when you urinate? 3   How often have you found it difficult to postpone urination? 5   How often have you had a weak urinary stream?  3   How often have you had to push or strain to begin urination? 2   How many times did you most typically get up to urinate from the time you went to bed at night until the time you got up in the morning? 2   Quality of Life: If you were to spend the rest of your life with your urinary condition just the way it is now, how would you feel about that?  5   AUA SYMPTOM SCORE  22          Review of Systems   Constitutional: Negative for chills, fever and unexpected weight change  Respiratory: Negative for shortness of breath  Cardiovascular: Negative for chest pain  Gastrointestinal: Negative for abdominal pain, constipation, diarrhea, nausea and vomiting  Genitourinary: Negative for difficulty urinating, dysuria, flank pain, frequency, hematuria and urgency  Allergic/Immunologic: Negative for immunocompromised state  Neurological: Negative for dizziness  Hematological: Does not bruise/bleed easily  Psychiatric/Behavioral: Positive for confusion and decreased concentration                    Past Medical History  Past Medical History:   Diagnosis Date    Arthritis     [facility]     High blood pressure     Hyperlipidemia     Hypertension     Stroke ([facility] )     5 YEARS AGO       Past Social History  Past Surgical History:   Procedure Laterality Date    EXCISIONAL HEMORRHOIDECTOMY      OTHER SURGICAL HISTORY      stent      Social History     Tobacco Use   Smoking Status Current Every Day Smoker    Packs/day: 1 00    Years: 30 00    Pack years: 30 00    Types: Cigarettes   Smokeless Tobacco Never Used       Past Family History  Family History   Problem Relation Age of Onset    Hypertension Mother     No Known Problems Father        Past Social history  Social History     Socioeconomic History    Marital status: Single     Spouse name: Not on file    Number of children: Not on file    Years of education: Not on file    Highest education level: Not on file   Occupational History    Not on file   Social Needs    Financial resource strain: Not on file    Food insecurity     Worry: Not on file     Inability: Not on file    Transportation needs     Medical: Not on file     Non-medical: Not on file   Tobacco Use    Smoking status: Current Every Day Smoker     Packs/day: 1 00     Years: 30 00     Pack years: 30 00     Types: Cigarettes    Smokeless tobacco: Never Used   Substance and Sexual Activity    Alcohol use: Yes     Frequency: 2-4 times a month     Comment: Occasional     Drug use: Never    Sexual activity: Not on file   Lifestyle    Physical activity     Days per week: Not on file     Minutes per session: Not on file    Stress: Not on file   Relationships    Social connections     Talks on phone: Not on file     Gets together: Not on file     Attends [employer] service: Not on file     Active member of club or organization: Not on file     Attends meetings of clubs or organizations: Not on file     Relationship status: Not on file    Intimate partner violence     Fear of current or ex partner: Not on file     Emotionally abused: Not on file     Physically abused: Not on file     Forced sexual activity: Not on file   Other Topics Concern    Not on file   Social History Narrative    · Most recent tobacco use screenin2020     · Do you currently or have you served in [employer]:   No      · Alcohol intake:   Occasional      · Are you currently employed:   No      · Occupational health risks:No history of asbestos exposure       - As per Anjel Hernandez        Current Medications  Current Outpatient Medications   Medication Sig Dispense Refill    amLODIPine (NORVASC) 5 mg tablet TAKE 1 TABLET (5 MG TOTAL) BY MOUTH DAILY 90 tablet 1    aspirin (ECOTRIN LOW STRENGTH) 81 mg EC tablet TAKE 1 TABLET (81 MG TOTAL) BY MOUTH DAILY 90 tablet 0    atorvastatin (LIPITOR) 40 mg tablet TAKE 1 TABLET (40 MG TOTAL) BY MOUTH DAILY AT BEDTIME 90 tablet 0    citalopram (CeleXA) 10 mg tablet TAKE 1 TABLET (10 MG TOTAL) BY MOUTH DAILY 30 tablet 1    fluticasone-salmeterol (ADVAIR, WIXELA) 250-50 mcg/dose inhaler INHALE 1 PUFF 2 (TWO) TIMES A DAY RINSE MOUTH AFTER USE  180 each 1    meloxicam (MOBIC) 15 mg tablet TAKE 1 TABLET (15 MG TOTAL) BY MOUTH DAILY PLEASE TAKE WITH FOOD 90 tablet 0     No current facility-administered medications for this visit  Allergies  No Known Allergies      The following portions of the patient's history were reviewed and updated as appropriate: allergies, current medications, past medical history, past social history, past surgical history and problem list       Vitals  Vitals:    05/14/21 1103   Weight: 63 5 kg (140 lb)   Height: 5' 7" (1 702 m)           Physical Exam  Physical Exam  Constitutional:       Appearance: Normal appearance  He is normal weight  HENT:      Head: Normocephalic and atraumatic  Right Ear: External ear normal       Left Ear: External ear normal    Eyes:      General: No scleral icterus  Conjunctiva/sclera: Conjunctivae normal    Neck:      Musculoskeletal: Normal range of motion  Pulmonary:      Effort: Pulmonary effort is normal    Genitourinary:     Comments: Prostate approximately 45 g  Smooth, firm, uniform consistency without nodules or tenderness  Musculoskeletal: Normal range of motion  Skin:     General: Skin is warm and dry  Neurological:      General: No focal deficit present  Mental Status: He is alert and oriented to person, place, and time  Mental status is at baseline     Psychiatric:         Mood and Affect: Mood normal          Behavior: Behavior normal          Thought Content:  Thought content normal          Judgment: Judgment normal            Results  Recent Results (from the past 1 hour(s))   POCT urine dip    Collection Time: 05/14/21 11:06 AM   Result Value Ref Range    LEUKOCYTE ESTERASE,UA TRACE     NITRITE,UA -     SL AMB POCT UROBILINOGEN 1     POCT URINE PROTEIN TRACE      PH,UA 5 0     BLOOD,UA -     SPECIFIC GRAVITY,UA 1 020     KETONES,UA -     BILIRUBIN,UA -     GLUCOSE, UA -      COLOR,UA YELLOW     CLARITY,UA CLEAR    POCT Measure PVR    Collection Time: 05/14/21 11:08 AM   Result Value Ref Range    POST-VOID RESIDUAL VOLUME, ML POC 24 mL   ]  No results found for: PSA  Lab Results   Component Value Date    CALCIUM 10 6 (H) 04/06/2021    K 3 7 04/06/2021    CO2 29 04/06/2021     04/06/2021    BUN 14 04/06/2021    CREATININE 1 09 04/06/2021     Lab Results   Component Value Date    WBC 6 50 04/06/2021    HGB 15 6 04/06/2021    HCT 45 3 04/06/2021    MCV 90 04/06/2021     04/06/2021           Orders  Orders Placed This Encounter   Procedures    POCT urine dip    POCT Measure PVR       Delroy De León PA-C

## 2021-05-14 ENCOUNTER — OFFICE VISIT (OUTPATIENT)
Dept: FAMILY MEDICINE CLINIC | Facility: CLINIC | Age: 66
End: 2021-05-14
Payer: COMMERCIAL

## 2021-05-14 ENCOUNTER — CONSULT (OUTPATIENT)
Dept: UROLOGY | Facility: CLINIC | Age: 66
End: 2021-05-14
Payer: COMMERCIAL

## 2021-05-14 ENCOUNTER — TELEPHONE (OUTPATIENT)
Dept: FAMILY MEDICINE CLINIC | Facility: CLINIC | Age: 66
End: 2021-05-14

## 2021-05-14 VITALS — BODY MASS INDEX: 21.97 KG/M2 | WEIGHT: 140 LBS | HEIGHT: 67 IN

## 2021-05-14 VITALS
WEIGHT: 140.4 LBS | HEIGHT: 67 IN | BODY MASS INDEX: 22.03 KG/M2 | OXYGEN SATURATION: 97 % | TEMPERATURE: 97.2 F | HEART RATE: 74 BPM | RESPIRATION RATE: 15 BRPM | SYSTOLIC BLOOD PRESSURE: 128 MMHG | DIASTOLIC BLOOD PRESSURE: 70 MMHG

## 2021-05-14 DIAGNOSIS — Z86.718 HISTORY OF DVT (DEEP VEIN THROMBOSIS): ICD-10-CM

## 2021-05-14 DIAGNOSIS — I10 ESSENTIAL HYPERTENSION: ICD-10-CM

## 2021-05-14 DIAGNOSIS — I73.9 CLAUDICATION OF RIGHT LOWER EXTREMITY (HCC): ICD-10-CM

## 2021-05-14 DIAGNOSIS — R32 URINARY INCONTINENCE, UNSPECIFIED TYPE: ICD-10-CM

## 2021-05-14 DIAGNOSIS — R41.89 COGNITIVE AND BEHAVIORAL CHANGES: ICD-10-CM

## 2021-05-14 DIAGNOSIS — R15.9 FULL INCONTINENCE OF FECES: ICD-10-CM

## 2021-05-14 DIAGNOSIS — R15.9 INCONTINENCE OF FECES, UNSPECIFIED FECAL INCONTINENCE TYPE: ICD-10-CM

## 2021-05-14 DIAGNOSIS — J42 CHRONIC BRONCHITIS, UNSPECIFIED CHRONIC BRONCHITIS TYPE (HCC): ICD-10-CM

## 2021-05-14 DIAGNOSIS — E78.00 HYPERCHOLESTEREMIA: ICD-10-CM

## 2021-05-14 DIAGNOSIS — R41.89 COGNITIVE DECLINE: Primary | ICD-10-CM

## 2021-05-14 DIAGNOSIS — F41.8 DEPRESSION WITH ANXIETY: ICD-10-CM

## 2021-05-14 DIAGNOSIS — J45.40 MODERATE PERSISTENT ASTHMA WITHOUT COMPLICATION: ICD-10-CM

## 2021-05-14 DIAGNOSIS — R46.89 COGNITIVE AND BEHAVIORAL CHANGES: ICD-10-CM

## 2021-05-14 DIAGNOSIS — N39.45 CONTINUOUS LEAKAGE OF URINE: Primary | ICD-10-CM

## 2021-05-14 DIAGNOSIS — Z12.5 PROSTATE CANCER SCREENING: ICD-10-CM

## 2021-05-14 DIAGNOSIS — N39.46 MIXED STRESS AND URGE URINARY INCONTINENCE: ICD-10-CM

## 2021-05-14 LAB
POST-VOID RESIDUAL VOLUME, ML POC: 24 ML
SL AMB  POCT GLUCOSE, UA: ABNORMAL
SL AMB LEUKOCYTE ESTERASE,UA: ABNORMAL
SL AMB POCT BILIRUBIN,UA: ABNORMAL
SL AMB POCT BLOOD,UA: ABNORMAL
SL AMB POCT CLARITY,UA: CLEAR
SL AMB POCT COLOR,UA: YELLOW
SL AMB POCT KETONES,UA: ABNORMAL
SL AMB POCT NITRITE,UA: ABNORMAL
SL AMB POCT PH,UA: 5
SL AMB POCT SPECIFIC GRAVITY,UA: 1.02
SL AMB POCT URINE PROTEIN: ABNORMAL
SL AMB POCT UROBILINOGEN: 1
VIT B1 BLD-SCNC: 94.7 NMOL/L (ref 66.5–200)

## 2021-05-14 PROCEDURE — 99214 OFFICE O/P EST MOD 30 MIN: CPT | Performed by: NURSE PRACTITIONER

## 2021-05-14 PROCEDURE — 87077 CULTURE AEROBIC IDENTIFY: CPT | Performed by: PHYSICIAN ASSISTANT

## 2021-05-14 PROCEDURE — 87086 URINE CULTURE/COLONY COUNT: CPT | Performed by: PHYSICIAN ASSISTANT

## 2021-05-14 PROCEDURE — 51798 US URINE CAPACITY MEASURE: CPT | Performed by: PHYSICIAN ASSISTANT

## 2021-05-14 PROCEDURE — 99203 OFFICE O/P NEW LOW 30 MIN: CPT | Performed by: PHYSICIAN ASSISTANT

## 2021-05-14 PROCEDURE — 81002 URINALYSIS NONAUTO W/O SCOPE: CPT | Performed by: PHYSICIAN ASSISTANT

## 2021-05-14 PROCEDURE — 87186 SC STD MICRODIL/AGAR DIL: CPT | Performed by: PHYSICIAN ASSISTANT

## 2021-05-15 LAB — VIT B6 SERPL-MCNC: 3.8 UG/L (ref 5.3–46.7)

## 2021-05-16 LAB — BACTERIA UR CULT: ABNORMAL

## 2021-05-17 ENCOUNTER — TELEPHONE (OUTPATIENT)
Dept: SURGERY | Facility: CLINIC | Age: 66
End: 2021-05-17

## 2021-05-17 ENCOUNTER — OFFICE VISIT (OUTPATIENT)
Dept: PULMONOLOGY | Facility: CLINIC | Age: 66
End: 2021-05-17
Payer: COMMERCIAL

## 2021-05-17 ENCOUNTER — TELEPHONE (OUTPATIENT)
Dept: UROLOGY | Facility: CLINIC | Age: 66
End: 2021-05-17

## 2021-05-17 ENCOUNTER — TELEPHONE (OUTPATIENT)
Dept: NEUROLOGY | Facility: CLINIC | Age: 66
End: 2021-05-17

## 2021-05-17 VITALS
BODY MASS INDEX: 22.13 KG/M2 | OXYGEN SATURATION: 97 % | SYSTOLIC BLOOD PRESSURE: 130 MMHG | HEART RATE: 77 BPM | HEIGHT: 67 IN | WEIGHT: 141 LBS | TEMPERATURE: 98.8 F | DIASTOLIC BLOOD PRESSURE: 76 MMHG

## 2021-05-17 DIAGNOSIS — J45.40 MODERATE PERSISTENT ASTHMA WITHOUT COMPLICATION: Primary | ICD-10-CM

## 2021-05-17 DIAGNOSIS — N30.90 CYSTITIS: Primary | ICD-10-CM

## 2021-05-17 DIAGNOSIS — N39.0 URINARY TRACT INFECTION WITHOUT HEMATURIA, SITE UNSPECIFIED: ICD-10-CM

## 2021-05-17 DIAGNOSIS — J42 CHRONIC BRONCHITIS, UNSPECIFIED CHRONIC BRONCHITIS TYPE (HCC): ICD-10-CM

## 2021-05-17 LAB
METHYLMALONATE SERPL-SCNC: 95 NMOL/L (ref 0–378)
SL AMB DISCLAIMER: NORMAL

## 2021-05-17 PROCEDURE — 4004F PT TOBACCO SCREEN RCVD TLK: CPT | Performed by: INTERNAL MEDICINE

## 2021-05-17 PROCEDURE — 99213 OFFICE O/P EST LOW 20 MIN: CPT | Performed by: INTERNAL MEDICINE

## 2021-05-17 RX ORDER — IPRATROPIUM BROMIDE AND ALBUTEROL SULFATE 2.5; .5 MG/3ML; MG/3ML
3 SOLUTION RESPIRATORY (INHALATION) 4 TIMES DAILY
Qty: 360 ML | Refills: 5 | Status: SHIPPED | OUTPATIENT
Start: 2021-05-17 | End: 2021-10-27

## 2021-05-17 RX ORDER — SULFAMETHOXAZOLE AND TRIMETHOPRIM 800; 160 MG/1; MG/1
1 TABLET ORAL EVERY 12 HOURS SCHEDULED
Qty: 14 TABLET | Refills: 0 | Status: SHIPPED | OUTPATIENT
Start: 2021-05-17 | End: 2021-05-24

## 2021-05-17 NOTE — ASSESSMENT & PLAN NOTE
Assumed diagnosis based on symptoms  Patient at risk for asthma progressing to COPD based on current smoking  Patient is not able to do PFTs because of cognitive dysfunction  Has a number of inhalers that have been ordered for him but I gather he is not really physically able to use these devices  I sense that he would do better with nebulized bronchodilators for comfort  DuoNeb ordered  I gave his son instructions in using this up to 4 times a day for relief of dyspnea

## 2021-05-17 NOTE — TELEPHONE ENCOUNTER
Spoke with patient's brother Alvin Brody (per consent in chart)  Advised him of results and recommendations  Brother verbalized understanding  Labs sent to PCP

## 2021-05-17 NOTE — TELEPHONE ENCOUNTER
Called and made patient aware      ----- Message from Davonte Lancaster PA-C sent at 5/17/2021  8:44 AM EDT -----  Please inform patient that urine culture is positive for infection   I have sent course of antibiotics to pharmacy

## 2021-05-17 NOTE — PROGRESS NOTES
Assessment/Plan: Moderate persistent asthma without complication  Assumed diagnosis based on symptoms  Patient at risk for asthma progressing to COPD based on current smoking  Patient is not able to do PFTs because of cognitive dysfunction  Has a number of inhalers that have been ordered for him but I gather he is not really physically able to use these devices  I sense that he would do better with nebulized bronchodilators for comfort  DuoNeb ordered  I gave his son instructions in using this up to 4 times a day for relief of dyspnea  Diagnoses and all orders for this visit:    Chronic bronchitis, unspecified chronic bronchitis type ([facility] )  -     Ambulatory referral to Pulmonology  -     ipratropium-albuterol (DUO-NEB) 0 5-2 5 mg/3 mL nebulizer solution; Take 1 vial (3 mL total) by nebulization 4 (four) times a day  -     Nebulizer          Subjective:      Patient ID: Meliton Melton is a 63 y o  male  Patient was last seen by Dr Jeremy Johnson in May 2020  He has a background of chronic dyspnea on exertion  Intermittent coughing and chest congestion  Patient is said to have a fair amount of dementia  Continues to smoke  Previously was given Spiriva but I gather that he never was either able or willing to use this medication  Does not use the albuterol as far as I can determine  Patient's son is concerned about his dyspnea  After discussion we decided that he would do better with nebulized bronchodilators in light of his cognitive dysfunction  I gave the son instructions uses up to 4 times a day based on his symptoms  Patient certainly is too ill to think that PFTs would be remotely possible  Also not reasonable think that he will stop smoking at this point in his life        The following portions of the patient's history were reviewed and updated as appropriate: allergies, current medications, past family history, past medical history, past social history, past surgical history and problem list     Review of Systems   Constitutional: Positive for activity change  Respiratory: Positive for cough and shortness of breath  Negative for wheezing  Cardiovascular: Negative for chest pain, palpitations and leg swelling  Objective:      /76 (BP Location: Left arm, Patient Position: Sitting, Cuff Size: Adult)   Pulse 77   Temp 98 8 °F (37 1 °C) (Tympanic)   Ht 5' 7" (1 702 m)   Wt 64 kg (141 lb)   SpO2 97%   BMI 22 08 kg/m²          Physical Exam  Vitals signs reviewed  Constitutional:       Appearance: He is normal weight  He is not ill-appearing  Neck:      Musculoskeletal: No neck rigidity or muscular tenderness  Cardiovascular:      Rate and Rhythm: Normal rate and regular rhythm  Pulses:           Radial pulses are 2+ on the right side and 2+ on the left side  Heart sounds: Normal heart sounds  Pulmonary:      Effort: Pulmonary effort is normal       Breath sounds: Rhonchi (Few with cough) present  No wheezing  Musculoskeletal:         General: No swelling  Right lower leg: No edema  Left lower leg: No edema  Lymphadenopathy:      Cervical: No cervical adenopathy  Neurological:      Mental Status: He is alert  Comments: Formal testing of the orientation not done     Psychiatric:         Mood and Affect: Mood normal          Behavior: Behavior normal

## 2021-05-17 NOTE — TELEPHONE ENCOUNTER
----- Message from Ivonne Dumont MD sent at 5/16/2021  6:59 AM EDT -----  Let pt know most labs back so far  Pt with low vit b6  Please send copy of labs to pcp  Pt needs oral supplementation  rec follow up with pcp

## 2021-05-18 ENCOUNTER — TRANSITIONAL CARE MANAGEMENT (OUTPATIENT)
Dept: GERIATRICS | Age: 66
End: 2021-05-18

## 2021-05-21 ENCOUNTER — TELEPHONE (OUTPATIENT)
Dept: NEUROLOGY | Facility: CLINIC | Age: 66
End: 2021-05-21

## 2021-05-21 NOTE — TELEPHONE ENCOUNTER
Neuropsychological Evaluation  [facility]  [address]  [place] [address]  P: [phone] F: [phone]    Intake Note  Date of Referral to Neuropsychology: 5/11/2021  Referring Provider: Dr Priscilla Kaba to conduct screening prior to scheduling neuropsychological evaluation  Spoke to patient who directed me to his brother for intake purposes and was able to reach the requested contact via phone  Explained nature of neuropsychological evaluation  Patient is agreeable to evaluation  The following questions were answered  1 ) Does the patient have the ability to do a virtual intake? No    2 ) Does the patient have any problems that would limit his ability to participate in testing that requires interaction with another person, such as hearing or language impairments? No    3 ) Does the patient have any problems that would limit his ability to complete testing during one appointment that can last for more than 2 hours? (e g , severe fatigue, pain, or other debility) No    4 ) Does the patient have a preference between completing the evaluation in one session, or over the course of a few sessions? No    5 ) Does the patient have difficulties ambulating (e g , does he need a walker, cane, or wheelchair)? No    6 ) Would the patient be available for a last minute appointment if the opportunity arises? Yes, mostly available    Referral will be reviewed by providers and we will call to schedule as appropriate  Referral is pending insurance review  Insurance company will be contacted to verify participation of providers and authorization/pre-certification for Neuropsychological evaluation  Patient has been made aware of this

## 2021-05-24 RX ORDER — CITALOPRAM 10 MG/1
10 TABLET ORAL DAILY
Qty: 90 TABLET | Refills: 1 | Status: SHIPPED | OUTPATIENT
Start: 2021-05-24 | End: 2021-08-19

## 2021-05-24 RX ORDER — ATORVASTATIN CALCIUM 40 MG/1
40 TABLET, FILM COATED ORAL
Qty: 90 TABLET | Refills: 1 | Status: SHIPPED | OUTPATIENT
Start: 2021-05-24 | End: 2021-09-28

## 2021-05-24 RX ORDER — ASPIRIN 81 MG/1
81 TABLET ORAL DAILY
Qty: 90 TABLET | Refills: 1 | Status: SHIPPED | OUTPATIENT
Start: 2021-05-24 | End: 2021-07-27

## 2021-05-24 RX ORDER — AMLODIPINE BESYLATE 5 MG/1
5 TABLET ORAL DAILY
Qty: 90 TABLET | Refills: 1 | Status: SHIPPED | OUTPATIENT
Start: 2021-05-24 | End: 2021-10-04

## 2021-05-24 NOTE — PATIENT INSTRUCTIONS
Urinary Incontinence   WHAT YOU NEED TO KNOW:   Urinary incontinence (UI) is when you lose control of your bladder  UI develops because your bladder cannot store or empty urine properly  The 3 most common types of UI are stress incontinence, urge incontinence, or both  DISCHARGE INSTRUCTIONS:   Call your doctor if:   · You have severe pain  · You are confused or cannot think clearly  · You have a fever  · You see blood in your urine  · You have pain when you urinate  · You have new or worse pain, even after treatment  · Your mouth feels dry or you have vision changes  · Your urine is cloudy or smells bad  · You have questions or concerns about your condition or care  Medicines:   · Medicines  may be given to help strengthen your bladder control  · Take your medicine as directed  Contact your healthcare provider if you think your medicine is not helping or if you have side effects  Tell him or her if you are allergic to any medicine  Keep a list of the medicines, vitamins, and herbs you take  Include the amounts, and when and why you take them  Bring the list or the pill bottles to follow-up visits  Carry your medicine list with you in case of an emergency  Do pelvic muscle exercises often:  Your pelvic muscles help you stop urinating  Squeeze these muscles tight for 5 seconds, then relax for 5 seconds  Gradually work up to squeezing for 10 seconds  Do 3 sets of 15 repetitions a day, or as directed  This will help strengthen your pelvic muscles and improve bladder control  Train your bladder:  Go to the bathroom at set times, such as every 2 hours, even if you do not feel the urge to go  You can also try to hold your urine when you feel the urge to go  For example, hold your urine for 5 minutes when you feel the urge to go  As that becomes easier, hold your urine for 10 minutes  Self-care:   · Keep a UI record  Write down how often you leak urine and how much you leak   Make a note of what you were doing when you leaked urine  · Drink liquids as directed  Ask your healthcare provider how much liquid to drink each day and which liquids are best for you  You may need to limit the amount of liquid you drink to help control your urine leakage  Do not drink any liquid right before you go to bed  Limit or do not have drinks that contain caffeine or alcohol  · Prevent constipation  Eat a variety of high-fiber foods  Good examples are high-fiber cereals, beans, vegetables, and whole-grain breads  Prune juice may help make your bowel movement softer  Walking is the best way to trigger your intestines to have a bowel movement  · Exercise regularly and maintain a healthy weight  Ask your healthcare provider how much you should weigh and about the best exercise plan for you  Weight loss and exercise will decrease pressure on your bladder and help you control your leakage  Ask him or her to help you create a weight loss plan if you are overweight  · Use a catheter as directed  to help empty your bladder  A catheter is a tiny, plastic tube that is put into your bladder to drain your urine  Your healthcare provider may tell you to use a catheter to prevent your bladder from getting too full and leaking urine  · Go to behavior therapy as directed  Behavior therapy may be used to help you learn to control your urge to urinate  Follow up with your healthcare provider as directed:  Write down your questions so you remember to ask them during your visits  © Copyright [address] Information is for End User's use only and may not be sold, redistributed or otherwise used for commercial purposes  All illustrations and images included in CareNotes® are the copyrighted property of A D A M , Inc  or [facility] Cecy Xie   The above information is an  only  It is not intended as medical advice for individual conditions or treatments   Talk to your doctor, nurse or pharmacist before following any medical regimen to see if it is safe and effective for you

## 2021-05-24 NOTE — TELEPHONE ENCOUNTER
MSW attempted to reach patient's brother at [phone]  No answer  MSW left a message requesting callback  Patient's brother called right back ([phone])  Patient's brother stated that he did receive a paper in the mail that patient had to sign, and that he had just mailed it back on Wednesday/Thursday of last week  MSW suggested a call to the [employer] to check the status of Waiver application  Patient's brother was agreeable to same  MSW facilitated conference call to [phone] and spoke with Oleg Rawls - she advised that the case was closed by the [employer] on 5/19 because they did not receive the needed financial documentation such as - bank statements for 2019, 2020, and 2021 (patient's brother stated that patient does not get bank statements since he has an [employer] debit card - patient's brother was counseled to contact the bank to get statements for [employer]); life insurance policy (patient's brother stated that patient does not have a one); and verification of property (patient's brother stated that patient does not own any property)  Oleg Rawls suggested that patient's brother contact the [employer] which is handling his application to see what they advise regarding above and provided their contact number as [phone]  MSW and patient's brother attempted to reach this number, but it was a wrong number  MSW and patient's brother then called to the [facility] [employer] [facility] [place] office at [phone]  Patient's brother provided patient's SSN as  and the representative was able was able to provide patient's 's name/number as Ms Martin at [phone]  MSW left a message for Ms Allen Sergio and requested that she call patient's brother back to provide guidance on what is needed/how he can prove that he does not have life insurance policy/property  MSW also provided this writer's contact number in case she cannot reach patient's brother       MSW will follow-up with patient's brother in about 2 days to see if he has heard back from Ms Ankit Palmer and to see if he was able to provide needed documents  While on the phone, MSW inquired if patient/brother had heard from [employer]  Patient's brother stated that they had not  MSW placed another call with referral to [employer] at [phone]  Patient's brother stated that he did get a call about being sent samples of gloves, pull-ups, and bed liners  Patient's brother stated that he also put in a call into patient's PCP office about getting a cane, walker, and shower chair  It appears that other offices are handling these DME items, but MSW will remain available, if needed

## 2021-05-24 NOTE — PROGRESS NOTES
Assessment/Plan:    Presents in office with brother - here for 6 week follow up and review work and follow up thus far   follow up multiple issues  And acute changes noted by this provider  change of mental status   weakness, frequent falls , intermittent confusion for the last 4 weeks , weight loss, personality and behavioral changes - urinating all over the house   Intermittent confusion   Was seen by Neurology-> ordered MRI   Scheduled for follow up with Urology and GI for further eval   And brother has to schedule him for  Follow up with Psych   He is doing Chris Hug with the Celexa a bit more compliant with things but as his brother states some days are better then others   HTN stable   PT and OT has been working with patient   He has been set up with home health services and home care assistance        Problem List Items Addressed This Visit        Respiratory    Moderate persistent asthma without complication    Relevant Medications    fluticasone-salmeterol (Bradley Gouge) 250-50 mcg/dose inhaler       Cardiovascular and Mediastinum    Essential hypertension    Relevant Medications    amLODIPine (NORVASC) 5 mg tablet       Other    Claudication of right lower extremity (HCC)    Relevant Medications    aspirin (ECOTRIN LOW STRENGTH) 81 mg EC tablet    Hypercholesteremia    Relevant Medications    atorvastatin (LIPITOR) 40 mg tablet    History of DVT (deep vein thrombosis)    Relevant Medications    aspirin (ECOTRIN LOW STRENGTH) 81 mg EC tablet    Cognitive and behavioral changes      Other Visit Diagnoses     Cognitive decline    -  Primary    has been seen by NEUROLOGY   work up continues   MRI has been ordered     Urinary incontinence, unspecified type        has follow up with UROLOGY     Incontinence of feces, unspecified fecal incontinence type        has follow up with GI     Chronic bronchitis, unspecified chronic bronchitis type ([address] )        Relevant Medications    fluticasone-salmeterol (Bradley Gouge) 250-50 mcg/dose inhaler    Depression with anxiety        Relevant Medications    citalopram (CeleXA) 10 mg tablet            Subjective:      Patient ID: Monik Arce is a 63 y o  male  Presents in office with brother - here for 6 week follow up and review work and follow up thus far   follow up multiple issues  And acute changes noted by this provider  change of mental status   weakness, frequent falls , intermittent confusion for the last 4 weeks , weight loss, personality and behavioral changes - urinating all over the house  Intermittent confusion   Was seen by Neurology-> ordered MRI   Scheduled for follow up with Urology and GI for further eval   And brother has to schedule him for  Follow up with Psych   He is doing Phelan Aas with the Celexa a bit more compliant with things but as his brother states some days are better then others   HTN stable   PT and OT has been working with patient   He has been set up with home health services and home care assistance       Patient Active Problem List:     Claudication of right lower extremity ([facility] [address] )     Essential hypertension     Hypercholesteremia     History of DVT (deep vein thrombosis)     Chronic pain of multiple joints     Moderate persistent asthma without complication        The following portions of the patient's history were reviewed and updated as appropriate:   Past Medical History:  He has a past medical history of Arthritis, Cancer ([facility] [address] ), High blood pressure, Hyperlipidemia, Hypertension, and Stroke ([facility] [address] )  ,  _______________________________________________________________________  Medical Problems:  does not have any pertinent problems on file ,  _______________________________________________________________________  Past Surgical History:   has a past surgical history that includes Excisional hemorrhoidectomy and Other surgical history  ,  _______________________________________________________________________  Family History:  family history includes Hypertension in his mother; No Known Problems in his father ,  _______________________________________________________________________  Social History:   reports that he has been smoking cigarettes  He has a 30 00 pack-year smoking history  He has never used smokeless tobacco  He reports current alcohol use  He reports that he does not use drugs  ,  _______________________________________________________________________  Allergies:  has No Known Allergies     _______________________________________________________________________  Current Outpatient Medications   Medication Sig Dispense Refill    amLODIPine (NORVASC) 5 mg tablet Take 1 tablet (5 mg total) by mouth daily 90 tablet 1    aspirin (ECOTRIN LOW STRENGTH) 81 mg EC tablet Take 1 tablet (81 mg total) by mouth daily 90 tablet 1    atorvastatin (LIPITOR) 40 mg tablet Take 1 tablet (40 mg total) by mouth daily at bedtime 90 tablet 1    citalopram (CeleXA) 10 mg tablet Take 1 tablet (10 mg total) by mouth daily 90 tablet 1    fluticasone-salmeterol (ADVAIR, WIXELA) 250-50 mcg/dose inhaler Inhale 1 puff 2 (two) times a day Rinse mouth after use 180 each 1    meloxicam (MOBIC) 15 mg tablet TAKE 1 TABLET (15 MG TOTAL) BY MOUTH DAILY PLEASE TAKE WITH FOOD 90 tablet 0    ipratropium-albuterol (DUO-NEB) 0 5-2 5 mg/3 mL nebulizer solution Take 1 vial (3 mL total) by nebulization 4 (four) times a day 360 mL 5    sulfamethoxazole-trimethoprim (BACTRIM DS) 800-160 mg per tablet Take 1 tablet by mouth every 12 (twelve) hours for 7 days 14 tablet 0     No current facility-administered medications for this visit       _______________________________________________________________________  Review of Systems   Constitutional: Positive for fatigue and unexpected weight change (weight loss)  Negative for chills and fever  HENT: Negative for congestion and sore throat  Eyes: Negative  Respiratory: Positive for shortness of breath  Negative for cough and wheezing  Cardiovascular: Negative for chest pain and palpitations  Gastrointestinal: Negative for abdominal distention, nausea and vomiting  Incont of stool    Genitourinary:        Incontinence of urine    Musculoskeletal: Positive for arthralgias and gait problem  Shuffled gait and weakness and frequent falls    Skin:        Dry skin    Neurological: Positive for weakness  Negative for headaches  Hematological: Negative for adenopathy  Psychiatric/Behavioral: Positive for confusion, decreased concentration and sleep disturbance  Negative for agitation and behavioral problems  The patient is nervous/anxious  Objective:  Vitals:    05/14/21 0909   BP: 128/70   BP Location: Left arm   Patient Position: Sitting   Cuff Size: Standard   Pulse: 74   Resp: 15   Temp: (!) 97 2 °F (36 2 °C)   TempSrc: Temporal   SpO2: 97%   Weight: 63 7 kg (140 lb 6 4 oz)   Height: 5' 7" (1 702 m)     Body mass index is 21 99 kg/m²  Physical Exam  Vitals signs and nursing note reviewed  Constitutional:       Appearance: Normal appearance  Comments: To self only   Confused to time and place    HENT:      Head: Atraumatic  Mouth/Throat:      Mouth: Mucous membranes are dry  Eyes:      Extraocular Movements: Extraocular movements intact  Neck:      Musculoskeletal: Normal range of motion  Cardiovascular:      Rate and Rhythm: Normal rate and regular rhythm  Pulses: Normal pulses  Heart sounds: Normal heart sounds  Pulmonary:      Breath sounds: Rhonchi present  Comments: Decreased lung sounds   Shortness of breath   Abdominal:      Palpations: Abdomen is soft  Musculoskeletal: Normal range of motion  Skin:     General: Skin is dry  Neurological:      Mental Status: He is alert  He is disoriented  Sensory: Sensory deficit present  Motor: Weakness present        Coordination: Coordination abnormal       Gait: Gait abnormal       Deep Tendon Reflexes: Reflexes abnormal  Comments: Right tongue deviation , mild but noticeable and right side arm and leg weaker then left        NT-BNP PRO  Order: [id number]  Status:  Final result   Visible to patient:  No (inaccessible in [address])   Next appt:  06/07/2021 at 03:30 PM in Neurology [facility] OP)   Dx:  Confusion; Agitated; Personality hua    Ref Range & Units 5/11/21 10:43 AM   NT-proBNP <125 pg/mL 64          Specimen Collected: 05/11/21 10:43 AM   Last Resulted: 05/11/21 11:58 AM           Vitamin B12  Order: [id number]  Status:  Final result   Visible to patient:  No (inaccessible in [address])   Next appt:  06/07/2021 at 03:30 PM in Neurology [facility])   Dx:  Cognitive and behavioral changes   Ref Range & Units 5/11/21 10:43 AM   Vitamin B-12 100 - 900 pg/mL 455          Specimen Collected: 05/11/21 10:43 AM   Last Resulted: 05/11/21  2:46 PM         Folate  Order: [id number]  Status:  Final result   Visible to patient:  No (inaccessible in [address])   Next appt:  06/07/2021 at 03:30 PM in Neurology [facility])   Dx:  Cognitive and behavioral changes   Ref Range & Units 5/11/21 10:43 AM   Folate 3 1 - 17 5 ng/mL 8 2          Specimen Collected: 05/11/21 10:43 AM   Last Resulted: 05/11/21  2:46 PM         Novel Coronavirus (Covid-19),PCR [facility] - [facility]   [facility]  Order: [id number]  Status:  Final result   Visible to patient:  No (inaccessible in [address])   Next appt:  06/07/2021 at 03:30 PM in Neurology [facility])   Dx:  Viral infection, unspecified  Specimen Information: Nasopharyngeal Swab         Ref Range & Units    SARS-CoV-2 Negative Negative            RPR  Order: [id number]  Status:  Final result   Visible to patient:  No (inaccessible in [address])   Next appt:  06/07/2021 at 03:30 PM in Neurology [facility])   Dx:  Confusion; Agitated; Memory deficit;        Ref Range & Units 4/6/21 11:30 AM   RPR Non-Reactive Non-Reactive          Specimen Collected: 04/06/21 11:30 AM   Last Resulted: 04/08/21 12:41 PM        Lab Flowsheet            Contains abnormal data HEMOGLOBIN A1C W/ EAG ESTIMATION  Order: [id number]  Status:  Final result   Visible to patient:  No (inaccessible in [address])   Next appt:  06/07/2021 at 03:30 PM in [facility])   Dx:  Confusion; Agitated; Weight loss; Mem    Ref Range & Units 4/6/21 11:30 AM   Hemoglobin A1C Normal 3 8-5 6%; PreDiabetic 5 7-6 4%; Diabetic >=6 5%; Glycemic control for adults with diabetes <7 0% % 5 8High     EAG mg/dl 120          Specimen Collected: 04/06/21 11:30 AM   Last Resulted: 04/07/21  4:20 AM           Ammonia  Order: [id number]  Status:  Final result   Visible to patient:  No (inaccessible in [address])   Next appt:  06/07/2021 at 03:30 PM in [facility])   Dx:  Confusion; Agitated; Memory deficit   Ref Range & Units 4/6/21 11:30 AM   Ammonia <=36 umol/L 27 45            Lyme Antibody Profile with reflex to WB  Order: [id number]  Status:  Final result   Visible to patient:  No (inaccessible in [address])   Next appt:  06/07/2021 at 03:30 PM in [facility])   Dx:  Confusion; Agitated; Personality hua    Ref Range & Units 4/6/21 11:30 AM   Lyme total antibody 0 00 - 119 67    Comment: Negative (0-119) Absence of detectable Borrelia burgdoferi Antibodies  A negative result does not exclude the possibility of Borrelia infection  If early Lyme disease is suspected,a second sample should be collected & tested 4 weeks after initial testing  Specimen Collected: 04/06/21 11:30 AM   Last Resulted: 04/07/21  2:51 AM              Study Result    CTA - CHEST WITH IV CONTRAST - PULMONARY ANGIOGRAM     INDICATION:   R79 89:  Other specified abnormal findings of blood chemistry  R06 02: Shortness of breath      COMPARISON: None      TECHNIQUE: CTA examination of the chest was performed using angiographic technique according to a protocol specifically tailored to evaluate for pulmonary embolism  Axial, sagittal, and coronal 2D reformatted images were created from the source data and   submitted for interpretation  In addition, coronal 3D MIP postprocessing was performed on the acquisition scanner        Radiation dose length product (DLP) for this visit:  263 1 mGy-cm   This examination, like all CT scans performed in the [facility], was performed utilizing techniques to minimize radiation dose exposure, including the use of iterative   reconstruction and automated exposure control      IV Contrast:  85 mL of iohexol (OMNIPAQUE)     FINDINGS:     PULMONARY ARTERIAL TREE:  No pulmonary embolus is seen       LUNGS:  No focal airspace consolidation    There is no tracheal or endobronchial lesion      PLEURA:  Unremarkable      HEART/GREAT VESSELS:  Unremarkable for patient's age      MEDIASTINUM AND LAURE:  Unremarkable      CHEST WALL AND LOWER NECK:   Unremarkable      VISUALIZED STRUCTURES IN THE UPPER ABDOMEN:  Unremarkable      OSSEOUS STRUCTURES:  No acute fracture or destructive osseous lesion      IMPRESSION:     No evidence for pulmonary embolism                  Workstation performed: [id number]      Imaging    CTA chest pe study (Order: [id number]) - 4/7/2021  Result History    CTA chest pe study (Order #[id number]) on 4/7/2021 - Order Result History Report

## 2021-05-26 NOTE — TELEPHONE ENCOUNTER
MSW phoned patient's brother, Kavon Gallagher, this date at [phone]  Patient's brother stated that he had not heard from the [facility] worker about what documents are needed in order for patient's eligibility to be determined  MSW offered to conference call the [facility] worker again, but Kavon Gallagher stated that he is currently out running errands, so now would not be a good time  Kavon Gallagher requested a callback on 5/27

## 2021-05-27 NOTE — TELEPHONE ENCOUNTER
MSW phoned patient's brother this date at [phone]  Vicente Carrasquillo reports that he still did not receive a callback from Ms Alejandro Hill  MSW facilitated conference call to Ms Martin at [phone] and was able to reach her  Ms  Alejandro Hill stated that she would be able to accept a statement from patient stating that:    -he has a [employer] Debit Card and does not get monthly statements  - Has no life insurance   - Owns no property    Ms Alejandro Hill stated that this will need to be signed by patient  Ms Alejandro Hill stated that she will mail patient a request for the above and it will come with a self-address, stamped envelope  MSW encouraged patient/brother to write the letter and to return to Ms Veronica NELSON  MSW will follow-up with patient's brother in about 1 5 weeks to see if he received the letter/envelope and returned it  Patient's brother was agreeable with this plan

## 2021-06-07 ENCOUNTER — HOSPITAL ENCOUNTER (OUTPATIENT)
Dept: NEUROLOGY | Facility: HOSPITAL | Age: 66
Discharge: HOME | End: 2021-06-07
Payer: COMMERCIAL

## 2021-06-07 DIAGNOSIS — R46.89 COGNITIVE AND BEHAVIORAL CHANGES: ICD-10-CM

## 2021-06-07 DIAGNOSIS — Z72.89 ALCOHOL USE: ICD-10-CM

## 2021-06-07 DIAGNOSIS — R41.0 CONFUSION: ICD-10-CM

## 2021-06-07 DIAGNOSIS — R79.89 POSITIVE D DIMER: ICD-10-CM

## 2021-06-07 DIAGNOSIS — R41.89 COGNITIVE AND BEHAVIORAL CHANGES: ICD-10-CM

## 2021-06-07 DIAGNOSIS — Z87.898 HISTORY OF DRUG USE: ICD-10-CM

## 2021-06-07 DIAGNOSIS — F68.8 PERSONALITY CHANGE: ICD-10-CM

## 2021-06-07 DIAGNOSIS — R06.02 SHORTNESS OF BREATH: ICD-10-CM

## 2021-06-07 DIAGNOSIS — R41.3 MEMORY DEFICIT: ICD-10-CM

## 2021-06-07 DIAGNOSIS — R63.4 WEIGHT LOSS: ICD-10-CM

## 2021-06-07 DIAGNOSIS — N39.46 MIXED STRESS AND URGE URINARY INCONTINENCE: ICD-10-CM

## 2021-06-07 DIAGNOSIS — R94.31 ABNORMAL EKG: ICD-10-CM

## 2021-06-07 DIAGNOSIS — R45.1 AGITATED: ICD-10-CM

## 2021-06-07 PROCEDURE — 95816 EEG AWAKE AND DROWSY: CPT

## 2021-06-08 PROCEDURE — 95816 EEG AWAKE AND DROWSY: CPT | Performed by: PSYCHIATRY & NEUROLOGY

## 2021-06-10 ENCOUNTER — OFFICE VISIT (OUTPATIENT)
Dept: GASTROENTEROLOGY | Facility: CLINIC | Age: 66
End: 2021-06-10
Payer: COMMERCIAL

## 2021-06-10 ENCOUNTER — HOSPITAL ENCOUNTER (OUTPATIENT)
Dept: MRI IMAGING | Facility: HOSPITAL | Age: 66
Discharge: HOME | End: 2021-06-10
Payer: COMMERCIAL

## 2021-06-10 ENCOUNTER — HOSPITAL ENCOUNTER (EMERGENCY)
Facility: HOSPITAL | Age: 66
Discharge: HOME | End: 2021-06-10
Attending: INTERNAL MEDICINE | Admitting: INTERNAL MEDICINE
Payer: COMMERCIAL

## 2021-06-10 VITALS
DIASTOLIC BLOOD PRESSURE: 99 MMHG | SYSTOLIC BLOOD PRESSURE: 198 MMHG | HEIGHT: 66 IN | BODY MASS INDEX: 23.14 KG/M2 | WEIGHT: 144 LBS | TEMPERATURE: 98.6 F

## 2021-06-10 VITALS
TEMPERATURE: 97.5 F | BODY MASS INDEX: 23.24 KG/M2 | SYSTOLIC BLOOD PRESSURE: 175 MMHG | DIASTOLIC BLOOD PRESSURE: 87 MMHG | RESPIRATION RATE: 18 BRPM | WEIGHT: 144 LBS | OXYGEN SATURATION: 100 % | HEART RATE: 59 BPM

## 2021-06-10 DIAGNOSIS — Z12.11 COLON CANCER SCREENING: Primary | ICD-10-CM

## 2021-06-10 DIAGNOSIS — R15.9 FULL INCONTINENCE OF FECES: ICD-10-CM

## 2021-06-10 DIAGNOSIS — R41.89 COGNITIVE AND BEHAVIORAL CHANGES: ICD-10-CM

## 2021-06-10 DIAGNOSIS — R46.89 COGNITIVE AND BEHAVIORAL CHANGES: ICD-10-CM

## 2021-06-10 DIAGNOSIS — K92.1 MELENA: ICD-10-CM

## 2021-06-10 DIAGNOSIS — I10 HYPERTENSION, UNSPECIFIED TYPE: Primary | ICD-10-CM

## 2021-06-10 LAB
ALBUMIN SERPL BCP-MCNC: 4.4 G/DL (ref 3.4–4.8)
ALP SERPL-CCNC: 46.9 U/L (ref 10–129)
ALT SERPL W P-5'-P-CCNC: 17 U/L (ref 5–63)
ANION GAP SERPL CALCULATED.3IONS-SCNC: 8 MMOL/L (ref 4–13)
AST SERPL W P-5'-P-CCNC: 18 U/L (ref 15–41)
BASOPHILS # BLD AUTO: 0.01 THOUSANDS/ΜL (ref 0–0.1)
BASOPHILS NFR BLD AUTO: 0 % (ref 0–1)
BILIRUB SERPL-MCNC: 0.47 MG/DL (ref 0.3–1.2)
BUN SERPL-MCNC: 11 MG/DL (ref 6–20)
CALCIUM SERPL-MCNC: 9.5 MG/DL (ref 8.4–10.2)
CHLORIDE SERPL-SCNC: 106 MMOL/L (ref 96–108)
CO2 SERPL-SCNC: 28 MMOL/L (ref 22–33)
CREAT SERPL-MCNC: 0.93 MG/DL (ref 0.5–1.2)
EOSINOPHIL # BLD AUTO: 0.19 THOUSAND/ΜL (ref 0–0.61)
EOSINOPHIL NFR BLD AUTO: 4 % (ref 0–6)
ERYTHROCYTE [DISTWIDTH] IN BLOOD BY AUTOMATED COUNT: 13.6 % (ref 11.6–15.1)
GFR SERPL CREATININE-BSD FRML MDRD: 87 ML/MIN/1.73SQ M
GLUCOSE SERPL-MCNC: 86 MG/DL (ref 65–140)
HCT VFR BLD AUTO: 39.4 % (ref 36.5–49.3)
HGB BLD-MCNC: 13.7 G/DL (ref 12–17)
IMM GRANULOCYTES # BLD AUTO: 0.01 THOUSAND/UL (ref 0–0.2)
IMM GRANULOCYTES NFR BLD AUTO: 0 % (ref 0–2)
LYMPHOCYTES # BLD AUTO: 1.53 THOUSANDS/ΜL (ref 0.6–4.47)
LYMPHOCYTES NFR BLD AUTO: 31 % (ref 14–44)
MCH RBC QN AUTO: 31.4 PG (ref 26.8–34.3)
MCHC RBC AUTO-ENTMCNC: 34.8 G/DL (ref 31.4–37.4)
MCV RBC AUTO: 90 FL (ref 82–98)
MONOCYTES # BLD AUTO: 0.39 THOUSAND/ΜL (ref 0.17–1.22)
MONOCYTES NFR BLD AUTO: 8 % (ref 4–12)
NEUTROPHILS # BLD AUTO: 2.82 THOUSANDS/ΜL (ref 1.85–7.62)
NEUTS SEG NFR BLD AUTO: 57 % (ref 43–75)
PLATELET # BLD AUTO: 167 THOUSANDS/UL (ref 149–390)
PMV BLD AUTO: 10.6 FL (ref 8.9–12.7)
POTASSIUM SERPL-SCNC: 3.5 MMOL/L (ref 3.5–5)
PROT SERPL-MCNC: 7.3 G/DL (ref 6.4–8.3)
RBC # BLD AUTO: 4.37 MILLION/UL (ref 3.88–5.62)
SODIUM SERPL-SCNC: 142 MMOL/L (ref 133–145)
TROPONIN I SERPL-MCNC: <0.03 NG/ML (ref 0–0.07)
WBC # BLD AUTO: 4.95 THOUSAND/UL (ref 4.31–10.16)

## 2021-06-10 PROCEDURE — 84484 ASSAY OF TROPONIN QUANT: CPT | Performed by: INTERNAL MEDICINE

## 2021-06-10 PROCEDURE — 85025 COMPLETE CBC W/AUTO DIFF WBC: CPT | Performed by: INTERNAL MEDICINE

## 2021-06-10 PROCEDURE — 99284 EMERGENCY DEPT VISIT MOD MDM: CPT

## 2021-06-10 PROCEDURE — A9585 GADOBUTROL INJECTION: HCPCS | Performed by: PSYCHIATRY & NEUROLOGY

## 2021-06-10 PROCEDURE — 80053 COMPREHEN METABOLIC PANEL: CPT | Performed by: INTERNAL MEDICINE

## 2021-06-10 PROCEDURE — 3008F BODY MASS INDEX DOCD: CPT | Performed by: INTERNAL MEDICINE

## 2021-06-10 PROCEDURE — 70553 MRI BRAIN STEM W/O & W/DYE: CPT

## 2021-06-10 PROCEDURE — 96374 THER/PROPH/DIAG INJ IV PUSH: CPT

## 2021-06-10 PROCEDURE — 36415 COLL VENOUS BLD VENIPUNCTURE: CPT | Performed by: INTERNAL MEDICINE

## 2021-06-10 PROCEDURE — 99284 EMERGENCY DEPT VISIT MOD MDM: CPT | Performed by: INTERNAL MEDICINE

## 2021-06-10 PROCEDURE — 93005 ELECTROCARDIOGRAM TRACING: CPT

## 2021-06-10 PROCEDURE — 96376 TX/PRO/DX INJ SAME DRUG ADON: CPT

## 2021-06-10 PROCEDURE — G1004 CDSM NDSC: HCPCS

## 2021-06-10 PROCEDURE — 99242 OFF/OP CONSLTJ NEW/EST SF 20: CPT | Performed by: PHYSICIAN ASSISTANT

## 2021-06-10 PROCEDURE — 72156 MRI NECK SPINE W/O & W/DYE: CPT

## 2021-06-10 RX ORDER — LABETALOL 20 MG/4 ML (5 MG/ML) INTRAVENOUS SYRINGE
10 ONCE
Status: DISCONTINUED | OUTPATIENT
Start: 2021-06-10 | End: 2021-06-10

## 2021-06-10 RX ORDER — LABETALOL 20 MG/4 ML (5 MG/ML) INTRAVENOUS SYRINGE
10 ONCE
Status: COMPLETED | OUTPATIENT
Start: 2021-06-10 | End: 2021-06-10

## 2021-06-10 RX ORDER — LISINOPRIL AND HYDROCHLOROTHIAZIDE 12.5; 1 MG/1; MG/1
1 TABLET ORAL DAILY
Qty: 30 TABLET | Refills: 0 | Status: SHIPPED | OUTPATIENT
Start: 2021-06-10

## 2021-06-10 RX ORDER — LABETALOL 20 MG/4 ML (5 MG/ML) INTRAVENOUS SYRINGE
5 ONCE
Status: COMPLETED | OUTPATIENT
Start: 2021-06-10 | End: 2021-06-10

## 2021-06-10 RX ORDER — POLYETHYLENE GLYCOL 3350 17 G/17G
POWDER, FOR SOLUTION ORAL
Qty: 238 G | Refills: 0 | Status: SHIPPED | OUTPATIENT
Start: 2021-06-10 | End: 2021-08-06

## 2021-06-10 RX ADMIN — GADOBUTROL 6 ML: 604.72 INJECTION INTRAVENOUS at 18:10

## 2021-06-10 RX ADMIN — LABETALOL 20 MG/4 ML (5 MG/ML) INTRAVENOUS SYRINGE 5 MG: at 13:32

## 2021-06-10 RX ADMIN — LABETALOL 20 MG/4 ML (5 MG/ML) INTRAVENOUS SYRINGE 10 MG: at 12:42

## 2021-06-10 NOTE — ED NOTES
Pts Brother - Cassi Phelps can be called to pick pt up  [phone]       [address], RN  06/10/21 5368

## 2021-06-10 NOTE — ED PROVIDER NOTES
History  Chief Complaint   Patient presents with    Hypertension     Pt was at a GI appointment today and BP was 182/104 and they told him to go to the ER  Pt denies any symptoms  This is a 63years old was sent from the clinic for having high blood pressure  Patient has history of gastritis and he went for follow-up and they found his blood pressure was very high so they sent him to the emergency room  Patient denies any headache dizziness, shortness of breaths, chest pain  Patient denies any nausea vomiting diarrhea diaphoresis  Patient has history of hypertension and he is on amlodipine 5 mg once daily  Patient has history of stroke in the past   Patient has history of asthma but he stated that he does not take the medication for the asthma now  On the arrival to the ER his blood pressure was 197/99  Patient took his blood pressure medications this morning  Patient has no other symptoms  Prior to Admission Medications   Prescriptions Last Dose Informant Patient Reported? Taking?    amLODIPine (NORVASC) 5 mg tablet 6/10/2021 at Unknown time  No Yes   Sig: Take 1 tablet (5 mg total) by mouth daily   aspirin (ECOTRIN LOW STRENGTH) 81 mg EC tablet 6/10/2021 at Unknown time  No Yes   Sig: Take 1 tablet (81 mg total) by mouth daily   atorvastatin (LIPITOR) 40 mg tablet 6/10/2021 at Unknown time  No Yes   Sig: Take 1 tablet (40 mg total) by mouth daily at bedtime   bisacodyl (DULCOLAX) 5 mg EC tablet   No No   Sig: Take as directed as per written office instructions   citalopram (CeleXA) 10 mg tablet 6/10/2021 at Unknown time  No Yes   Sig: Take 1 tablet (10 mg total) by mouth daily   fluticasone-salmeterol (ADVAIR, WIXELA) 250-50 mcg/dose inhaler   No No   Sig: Inhale 1 puff 2 (two) times a day Rinse mouth after use   ipratropium-albuterol (DUO-NEB) 0 5-2 5 mg/3 mL nebulizer solution   No No   Sig: Take 1 vial (3 mL total) by nebulization 4 (four) times a day   meloxicam (MOBIC) 15 mg tablet 6/10/2021 at Unknown time Self No Yes   Sig: TAKE 1 TABLET (15 MG TOTAL) BY MOUTH DAILY PLEASE TAKE WITH FOOD   polyethylene glycol (GLYCOLAX) 17 GM/SCOOP powder   No No   Sig: Take as directed as per written office instructions      Facility-Administered Medications: None       Past Medical History:   Diagnosis Date    Arthritis     Cancer ([facility] )     High blood pressure     Hyperlipidemia     Hypertension     Stroke ([facility] )     5 YEARS AGO       Past Surgical History:   Procedure Laterality Date    EXCISIONAL HEMORRHOIDECTOMY      OTHER SURGICAL HISTORY      stent        Family History   Problem Relation Age of Onset    Hypertension Mother     No Known Problems Father      I have reviewed and agree with the history as documented  E-Cigarette/Vaping    E-Cigarette Use Never User      E-Cigarette/Vaping Substances     Social History     Tobacco Use    Smoking status: Current Every Day Smoker     Packs/day: 1 00     Years: 30 00     Pack years: 30 00     Types: Cigarettes    Smokeless tobacco: Never Used   Substance Use Topics    Alcohol use: Yes     Frequency: 2-4 times a month     Comment: Occasional     Drug use: Never       Review of Systems   Constitutional: Negative for diaphoresis, fatigue and fever  HENT: Negative for ear pain, facial swelling, sinus pressure, sinus pain, sore throat, tinnitus and trouble swallowing  Respiratory: Negative for cough, chest tightness and shortness of breath  Cardiovascular: Negative for chest pain, palpitations and leg swelling  Gastrointestinal: Negative for abdominal pain, diarrhea, nausea and vomiting  Genitourinary: Negative for difficulty urinating, dysuria, flank pain and hematuria  Musculoskeletal: Negative for arthralgias, back pain, gait problem, neck pain and neck stiffness  Skin: Negative for color change, pallor and rash     Neurological: Negative for dizziness, tremors, seizures, speech difficulty, weakness, light-headedness and headaches  Hematological: Negative for adenopathy  Does not bruise/bleed easily  Psychiatric/Behavioral: Negative for agitation and behavioral problems  Physical Exam  Physical Exam  Vitals signs and nursing note reviewed  Constitutional:       General: He is not in acute distress  Appearance: He is well-developed  He is not diaphoretic  HENT:      Head: Normocephalic and atraumatic  Right Ear: External ear normal       Left Ear: Ear canal and external ear normal       Nose: Nose normal  No congestion or rhinorrhea  Mouth/Throat:      Pharynx: No oropharyngeal exudate or posterior oropharyngeal erythema  Eyes:      Extraocular Movements: Extraocular movements intact  Conjunctiva/sclera: Conjunctivae normal       Pupils: Pupils are equal, round, and reactive to light  Neck:      Musculoskeletal: Normal range of motion and neck supple  Cardiovascular:      Rate and Rhythm: Normal rate and regular rhythm  Pulses: Normal pulses  Heart sounds: Normal heart sounds  No murmur  No friction rub  Pulmonary:      Effort: Pulmonary effort is normal  No respiratory distress  Breath sounds: Normal breath sounds  No wheezing or rales  Chest:      Chest wall: No tenderness  Abdominal:      General: Bowel sounds are normal  There is no distension  Palpations: Abdomen is soft  There is no mass  Tenderness: There is no abdominal tenderness  There is no right CVA tenderness, left CVA tenderness or guarding  Musculoskeletal: Normal range of motion  General: No swelling, tenderness or deformity  Right lower leg: No edema  Left lower leg: No edema  Skin:     General: Skin is warm and dry  Capillary Refill: Capillary refill takes less than 2 seconds  Neurological:      General: No focal deficit present  Mental Status: He is alert and oriented to person, place, and time     Psychiatric:         Mood and Affect: Mood normal  Behavior: Behavior normal          Vital Signs  ED Triage Vitals [06/10/21 1202]   Temperature Pulse Respirations Blood Pressure SpO2   97 5 °F (36 4 °C) 55 18 (!) 194/99 99 %      Temp Source Heart Rate Source Patient Position - Orthostatic VS BP Location FiO2 (%)   Oral Monitor -- -- --      Pain Score       --           Vitals:    06/10/21 1254 06/10/21 1322 06/10/21 1406 06/10/21 1415   BP: (!) 179/86 167/87 (!) 185/92 (!) 175/87   Pulse: 57 61 59          Visual Acuity      ED Medications  Medications   Labetalol HCl (NORMODYNE) injection 10 mg (10 mg Intravenous Given 6/10/21 1242)   Labetalol HCl (NORMODYNE) injection 5 mg (5 mg Intravenous Given 6/10/21 1332)       Diagnostic Studies  Results Reviewed     Procedure Component Value Units Date/Time    Comprehensive metabolic panel [[id number]] Collected: 06/10/21 1244    Lab Status: Final result Specimen: Blood from Arm, Right Updated: 06/10/21 1316     Sodium 142 mmol/L      Potassium 3 5 mmol/L      Chloride 106 mmol/L      CO2 28 mmol/L      ANION GAP 8 mmol/L      BUN 11 mg/dL      Creatinine 0 93 mg/dL      Glucose 86 mg/dL      Calcium 9 5 mg/dL      AST 18 U/L      ALT 17 U/L      Alkaline Phosphatase 46 9 U/L      Total Protein 7 3 g/dL      Albumin 4 4 g/dL      Total Bilirubin 0 47 mg/dL      eGFR 87 ml/min/1 73sq m     Narrative:      [place] guidelines for Chronic Kidney Disease (CKD):     Stage 1 with normal or high GFR (GFR > 90 mL/min/1 73 square meters)    Stage 2 Mild CKD (GFR = 60-89 mL/min/1 73 square meters)    Stage 3A Moderate CKD (GFR = 45-59 mL/min/1 73 square meters)    Stage 3B Moderate CKD (GFR = 30-44 mL/min/1 73 square meters)    Stage 4 Severe CKD (GFR = 15-29 mL/min/1 73 square meters)    Stage 5 End Stage CKD (GFR <15 mL/min/1 73 square meters)  Note: GFR calculation is accurate only with a steady state creatinine    Troponin I [[id number]]  (Normal) Collected: 06/10/21 1244    Lab Status: Final result Specimen: Blood from Arm, Right Updated: 06/10/21 1315     Troponin I <0 03 ng/mL     CBC and differential [[id number]]  (Normal) Collected: 06/10/21 1244    Lab Status: Final result Specimen: Blood from Arm, Right Updated: 06/10/21 1252     WBC 4 95 Thousand/uL      RBC 4 37 Million/uL      Hemoglobin 13 7 g/dL      Hematocrit 39 4 %      MCV 90 fL      MCH 31 4 pg      MCHC 34 8 g/dL      RDW 13 6 %      MPV 10 6 fL      Platelets 623 Thousands/uL      Neutrophils Relative 57 %      Immat GRANS % 0 %      Lymphocytes Relative 31 %      Monocytes Relative 8 %      Eosinophils Relative 4 %      Basophils Relative 0 %      Neutrophils Absolute 2 82 Thousands/µL      Immature Grans Absolute 0 01 Thousand/uL      Lymphocytes Absolute 1 53 Thousands/µL      Monocytes Absolute 0 39 Thousand/µL      Eosinophils Absolute 0 19 Thousand/µL      Basophils Absolute 0 01 Thousands/µL                  No orders to display              Procedures  Procedures         ED Course  ED Course as of Adonay 10 1814   Thu Adonay 10, 2021   1253 EKG; Sinus rhythm rate 63/min, LAFB, No ST elevation or T wave inversion      1342 Pt BP go down to 167/87  Pt remain asymptomatic  MDM  Number of Diagnoses or Management Options  Diagnosis management comments: The case discussed with the patient informed him that the blood pressure coming down nicely  All question concerns of the patient answered  I reviewed EKG finding with him  At this point we are going to discharge him home advised to follow up with his primary doctor and I told him that he may get benefit of having another blood pressure medication  Patient will be discharged on lisinopril in addition with a rate the pain         Amount and/or Complexity of Data Reviewed  Clinical lab tests: ordered and reviewed  Tests in the radiology section of CPT®: ordered and reviewed    Risk of Complications, Morbidity, and/or Mortality  Presenting problems: moderate  Diagnostic procedures: moderate  Management options: low    Patient Progress  Patient progress: improved      Disposition  Final diagnoses:   Hypertension, unspecified type     Time reflects when diagnosis was documented in both MDM as applicable and the Disposition within this note     Time User Action Codes Description Comment    6/10/2021  1:59 PM Bambi Milton Add [I10] Hypertension, unspecified type       ED Disposition     ED Disposition Condition Date/Time Comment    Discharge Stable Thu Adonay 10, 2021  1:59 PM Rheajenniffer Woodse discharge to home/self care              Follow-up Information     Follow up With Specialties Details Why Contact Info    KELLY Ochoa Nurse Practitioner, Family Medicine In 1 week  [phone]   [phone]            Discharge Medication List as of 6/10/2021  2:05 PM      START taking these medications    Details   lisinopril-hydrochlorothiazide (PRINZIDE,ZESTORETIC) 10-12 5 MG per tablet Take 1 tablet by mouth daily, Starting Thu 6/10/2021, Normal         CONTINUE these medications which have NOT CHANGED    Details   amLODIPine (NORVASC) 5 mg tablet Take 1 tablet (5 mg total) by mouth daily, Starting Mon 5/24/2021, Until Sun 8/22/2021, Normal      aspirin (ECOTRIN LOW STRENGTH) 81 mg EC tablet Take 1 tablet (81 mg total) by mouth daily, Starting Mon 5/24/2021, Until Wed 6/23/2021, Normal      atorvastatin (LIPITOR) 40 mg tablet Take 1 tablet (40 mg total) by mouth daily at bedtime, Starting Mon 5/24/2021, Until Sun 8/22/2021, Normal      citalopram (CeleXA) 10 mg tablet Take 1 tablet (10 mg total) by mouth daily, Starting Mon 5/24/2021, Until Sun 8/22/2021, Normal      meloxicam (MOBIC) 15 mg tablet TAKE 1 TABLET (15 MG TOTAL) BY MOUTH DAILY PLEASE TAKE WITH FOOD, Starting Thu 5/13/2021, Until Wed 8/11/2021, Normal      bisacodyl (DULCOLAX) 5 mg EC tablet Take as directed as per written office instructions, Normal fluticasone-salmeterol (ADVAIR, WIXELA) 250-50 mcg/dose inhaler Inhale 1 puff 2 (two) times a day Rinse mouth after use, Starting Mon 5/24/2021, Normal      ipratropium-albuterol (DUO-NEB) 0 5-2 5 mg/3 mL nebulizer solution Take 1 vial (3 mL total) by nebulization 4 (four) times a day, Starting Mon 5/17/2021, Normal      polyethylene glycol (GLYCOLAX) 17 GM/SCOOP powder Take as directed as per written office instructions, Normal           No discharge procedures on file      PDMP Review     None          ED Provider  Electronically Signed by           Diana Alvarez MD  06/10/21 0185

## 2021-06-10 NOTE — DISCHARGE INSTRUCTIONS
Take medication as prescribed  Follow up with your Primary provider  Labs Reviewed   CBC AND DIFFERENTIAL - Normal       Result Value Ref Range Status    WBC 4 95  4 31 - 10 16 Thousand/uL Final    RBC 4 37  3 88 - 5 62 Million/uL Final    Hemoglobin 13 7  12 0 - 17 0 g/dL Final    Hematocrit 39 4  36 5 - 49 3 % Final    MCV 90  82 - 98 fL Final    MCH 31 4  26 8 - 34 3 pg Final    MCHC 34 8  31 4 - 37 4 g/dL Final    RDW 13 6  11 6 - 15 1 % Final    MPV 10 6  8 9 - 12 7 fL Final    Platelets 046  796 - 390 Thousands/uL Final    Neutrophils Relative 57  43 - 75 % Final    Immat GRANS % 0  0 - 2 % Final    Lymphocytes Relative 31  14 - 44 % Final    Monocytes Relative 8  4 - 12 % Final    Eosinophils Relative 4  0 - 6 % Final    Basophils Relative 0  0 - 1 % Final    Neutrophils Absolute 2 82  1 85 - 7 62 Thousands/µL Final    Immature Grans Absolute 0 01  0 00 - 0 20 Thousand/uL Final    Lymphocytes Absolute 1 53  0 60 - 4 47 Thousands/µL Final    Monocytes Absolute 0 39  0 17 - 1 22 Thousand/µL Final    Eosinophils Absolute 0 19  0 00 - 0 61 Thousand/µL Final    Basophils Absolute 0 01  0 00 - 0 10 Thousands/µL Final   TROPONIN I - Normal    Troponin I <0 03  0 00 - 0 07 ng/mL Final    Comment: [employer] Chemistry analyzer 99% cutoff is > 0 03ng/mL    o cTnl 99% cutoff is useful only when applied to patients in the clinical setting of myocardial ischemia  o cTnl 99% cutoff should be interpreted in the context of clinical history, ECG findings and possibly cardiac imaging to establish correct diagnosis  o cTnl 99% cutoff may be suggestive but clearly not indicative of a coronary event without the clinical setting of myocardial ischemia     COMPREHENSIVE METABOLIC PANEL    Sodium 268  133 - 145 mmol/L Final    Potassium 3 5  3 5 - 5 0 mmol/L Final    Chloride 106  96 - 108 mmol/L Final    CO2 28  22 - 33 mmol/L Final    ANION GAP 8  4 - 13 mmol/L Final    BUN 11  6 - 20 mg/dL Final    Creatinine 0 93  0 50 - 1 20 mg/dL Final    Comment: Standardized to IDMS reference method    Glucose 86  65 - 140 mg/dL Final    Comment: If the patient is fasting, the ADA then defines impaired fasting glucose as > 100 mg/dL and diabetes as > or equal to 123 mg/dL  Specimen collection should occur prior to Sulfasalazine administration due to the potential for falsely depressed results  Specimen collection should occur prior to Sulfapyridine administration due to the potential for falsely elevated results  Calcium 9 5  8 4 - 10 2 mg/dL Final    AST 18  15 - 41 U/L Final    Comment: Specimen collection should occur prior to Sulfasalazine administration due to the potential for falsely depressed results  ALT 17  5 - 63 U/L Final    Comment: Specimen collection should occur prior to Sulfasalazine administration due to the potential for falsely depressed results       Alkaline Phosphatase 46 9  10 - 129 U/L Final    Total Protein 7 3  6 4 - 8 3 g/dL Final    Albumin 4 4  3 4 - 4 8 g/dL Final    Total Bilirubin 0 47  0 30 - 1 20 mg/dL Final    eGFR 87  ml/min/1 73sq m Final    Narrative:     [place] guidelines for Chronic Kidney Disease (CKD):     Stage 1 with normal or high GFR (GFR > 90 mL/min/1 73 square meters)    Stage 2 Mild CKD (GFR = 60-89 mL/min/1 73 square meters)    Stage 3A Moderate CKD (GFR = 45-59 mL/min/1 73 square meters)    Stage 3B Moderate CKD (GFR = 30-44 mL/min/1 73 square meters)    Stage 4 Severe CKD (GFR = 15-29 mL/min/1 73 square meters)    Stage 5 End Stage CKD (GFR <15 mL/min/1 73 square meters)  Note: GFR calculation is accurate only with a steady state creatinine

## 2021-06-10 NOTE — PROGRESS NOTES
[facility] - Outpatient Consultation  Roseanne Mcgovern 63 y o  male MRN: [id number]  Encounter: [id number]      Assessment and Plan    1  Melena  2  Colon cancer screening   The patient   Presents today with his brother as he has been experiencing cognitive decline  He has been seen by Neurology and is pending an MRI of the brain  He was referred to us secondary to fecal incontinence however he states that he is not having any diarrhea but rather a daily formed bowel movement without blood  There is question as to whether not he had melena as both the patient and his brother reports seeing black stool  The patient denies any other GI symptoms including but not limited to dysphagia, odynophagia, acid reflux, abdominal pain  The patient's brother does report that he previously had a 10-15 lb weight loss secondary to decreased appetite but now seems that his appetite has improved  It is however taking him over an hour to eat 1 meal   He has never had EGD or colonoscopy in the past   - ensure 25 g of fiber daily  - discussed both EGD and colonoscopy given his melena and need for colon cancer screening, the patient is agreeable to proceeding, risks and benefits were discussed  - MiraLax/Dulcolax prep sent to pharmacy   - discussed we can start a PPI given his reports of black stool however the patient opts to perform EGD prior    3  Hypertension  The patient's blood pressure in the office today is elevated at 198/99 even on repeat at the end of his visit  The patient does take amlodipine 5mg daily and states that he did take this medication this morning  He denies any headache, dizziness, lightheadedness, etc   - recommend emergency room visit, patient agreeable    FU after EGD/colonoscopy     ______________________________________________________________________    History of Present Illness  Roseanne Mcgovern is a 63 y o  male here for consultation of fecal incontinence    The patient has been having some cognitive decline and presents with his brother  The patient states that he is not having any diarrhea or constipation  He is having daily formed bowel movement without any bright red rectal bleeding  He and his brother have witnessed black stools  He denies any other GI symptoms including but not limited to dysphagia, odynophagia, nausea, vomiting, or abdominal pain  His brother reports that he had lost 10-15 lb and seems to have had a decreased appetite which is now improved  He also states it takes him a very long time to eat but again the patient denies any dysphagia  Review of Systems   Constitutional: Negative for activity change, appetite change, chills, fatigue, fever and unexpected weight change  HENT: Negative for sore throat and trouble swallowing  Gastrointestinal: Negative for abdominal distention, abdominal pain, anal bleeding, blood in stool, constipation, diarrhea, nausea, rectal pain and vomiting  Musculoskeletal: Negative for back pain and gait problem  Psychiatric/Behavioral: Negative for confusion         Past Medical History  Past Medical History:   Diagnosis Date    Arthritis     Cancer [facility]     High blood pressure     Hyperlipidemia     Hypertension     Stroke ([facility] )     5 YEARS AGO       Past Social history  Past Surgical History:   Procedure Laterality Date    EXCISIONAL HEMORRHOIDECTOMY      OTHER SURGICAL HISTORY      stent      Social History     Socioeconomic History    Marital status: Single     Spouse name: Not on file    Number of children: Not on file    Years of education: Not on file    Highest education level: Not on file   Occupational History    Not on file   Social Needs    Financial resource strain: Not on file    Food insecurity     Worry: Not on file     Inability: Not on file    Transportation needs     Medical: Not on file     Non-medical: Not on file   Tobacco Use    Smoking status: Current Every Day Smoker     Packs/day: 1 00     Years: 30 00     Pack years: 30 00     Types: Cigarettes    Smokeless tobacco: Never Used   Substance and Sexual Activity    Alcohol use: Yes     Frequency: 2-4 times a month     Comment: Occasional     Drug use: Never    Sexual activity: Not on file   Lifestyle    Physical activity     Days per week: Not on file     Minutes per session: Not on file    Stress: Not on file   Relationships    Social connections     Talks on phone: Not on file     Gets together: Not on file     Attends Gnosticism service: Not on file     Active member of club or organization: Not on file     Attends meetings of clubs or organizations: Not on file     Relationship status: Not on file    Intimate partner violence     Fear of current or ex partner: Not on file     Emotionally abused: Not on file     Physically abused: Not on file     Forced sexual activity: Not on file   Other Topics Concern    Not on file   Social History Narrative    · Most recent tobacco use screenin2020     · Do you currently or have you served in [employer]:   No      · Alcohol intake:   Occasional      · Are you currently employed:   No      · Occupational health risks:No history of asbestos exposure       - As per [place]      Social History     Substance and Sexual Activity   Alcohol Use Yes    Frequency: 2-4 times a month    Comment: Occasional      Social History     Substance and Sexual Activity   Drug Use Never     Social History     Tobacco Use   Smoking Status Current Every Day Smoker    Packs/day: 1 00    Years: 30 00    Pack years: 30 00    Types: Cigarettes   Smokeless Tobacco Never Used       Past Family History  Family History   Problem Relation Age of Onset    Hypertension Mother     No Known Problems Father        Current Medications  Current Outpatient Medications   Medication Sig Dispense Refill    amLODIPine (NORVASC) 5 mg tablet Take 1 tablet (5 mg total) by mouth daily 90 tablet 1    aspirin (ECOTRIN LOW STRENGTH) 81 mg EC tablet Take 1 tablet (81 mg total) by mouth daily 90 tablet 1    atorvastatin (LIPITOR) 40 mg tablet Take 1 tablet (40 mg total) by mouth daily at bedtime 90 tablet 1    citalopram (CeleXA) 10 mg tablet Take 1 tablet (10 mg total) by mouth daily 90 tablet 1    fluticasone-salmeterol (ADVAIR, WIXELA) 250-50 mcg/dose inhaler Inhale 1 puff 2 (two) times a day Rinse mouth after use 180 each 1    ipratropium-albuterol (DUO-NEB) 0 5-2 5 mg/3 mL nebulizer solution Take 1 vial (3 mL total) by nebulization 4 (four) times a day 360 mL 5    meloxicam (MOBIC) 15 mg tablet TAKE 1 TABLET (15 MG TOTAL) BY MOUTH DAILY PLEASE TAKE WITH FOOD 90 tablet 0     No current facility-administered medications for this visit  Allergies  No Known Allergies      The following portions of the patient's history were reviewed and updated as appropriate: allergies, current medications, past medical history, past social history, past surgical history and problem list       Vitals  There were no vitals filed for this visit  Physical Exam  Constitutional   General appearance: Patient is seated and in no acute distress, well appearing and well nourished  Head and Face   Head and face: Normal     Eyes   Conjunctiva and lids: No erythema, swelling or discharge  Anicteric  Ears, Nose, Mouth, and Throat   Hearing: Normal     Neck: Supple, trachea midline  Pulmonary   Respiratory effort: No increased work of breathing or signs of respiratory distress  Lungs: Clear to ascultation, no wheezes, rhonchi, or rales  Cardiovascular   Heart: Regular rate and rhythm, no murmurs gallops or rubs   Examination of extremities for edema and/or varicosities: Normal     Abdomen   Abdomen: Soft, non-tender, no masses, no organomegaly  Normal bowel sounds  Musculoskeletal   Gait and station: Normal     Skin   Skin and subcutaneous tissue: Warm, dry, and intact  No visible jaundice, lesions or rashes    Psychiatric   Judgment and insight: Normal  Recent and remote memory:  Normal  Mood and affect: Normal      Results  No visits with results within 1 Day(s) from this visit  Latest known visit with results is:   Consult on 05/14/2021   Component Date Value    LEUKOCYTE ESTERASE,UA 05/14/2021 TRACE     NITRITE,UA 05/14/2021 -     SL AMB POCT UROBILINOGEN 05/14/2021 1     POCT URINE PROTEIN 05/14/2021 TRACE      PH,UA 05/14/2021 5 0     BLOOD,UA 05/14/2021 -     SPECIFIC GRAVITY,UA 05/14/2021 1 020     KETONES,UA 05/14/2021 -     BILIRUBIN,UA 05/14/2021 -     GLUCOSE, UA 05/14/2021 -      COLOR,UA 05/14/2021 YELLOW     CLARITY,UA 05/14/2021 CLEAR     POST-VOID RESIDUAL VOLUM* 05/14/2021 24     Urine Culture 05/14/2021 70,000-79,000 cfu/ml Escherichia coli*       Radiology Results  No results found  Orders  No orders of the defined types were placed in this encounter

## 2021-06-12 LAB
ATRIAL RATE: 63 BPM
P AXIS: 68 DEGREES
PR INTERVAL: 165 MS
QRS AXIS: -63 DEGREES
QRSD INTERVAL: 92 MS
QT INTERVAL: 438 MS
QTC INTERVAL: 449 MS
T WAVE AXIS: 56 DEGREES
VENTRICULAR RATE: 63 BPM

## 2021-06-12 PROCEDURE — 93010 ELECTROCARDIOGRAM REPORT: CPT | Performed by: INTERNAL MEDICINE

## 2021-06-15 ENCOUNTER — TELEPHONE (OUTPATIENT)
Dept: NEUROLOGY | Facility: CLINIC | Age: 66
End: 2021-06-15

## 2021-06-15 DIAGNOSIS — M50.023 CERVICAL DISC DISORDER AT C6-C7 LEVEL WITH MYELOPATHY: Primary | ICD-10-CM

## 2021-06-15 NOTE — TELEPHONE ENCOUNTER
Please see mri c spine results for full message  Pt also had mri head done as well  Please let pt know mri head with evidence of chronic small vessel disease  Of note, pt also with some petechial hemorrhage likely from uncontrolled hypertension or amyloid deposition  Pt needs to follow up with our office for appt  also needs regular pcp appts for bp and other stroke risk factors  Please see mri c spine with evidence of cord involvement and need for neurosurgery consultation as a fit in per dr Yari Ng and jay conklin

## 2021-06-15 NOTE — TELEPHONE ENCOUNTER
Pt's brother Patrick Born (on communication consent) made aware  He verbalizes understanding  Pt is already scheduled with neurosurgery on 6/21/21

## 2021-06-15 NOTE — TELEPHONE ENCOUNTER
----- Message from Katrina Jackson MD sent at 6/15/2021  3:23 PM EDT -----  Nursing, Let pt know mri c spine with Multilevel spondylosis and osteoarthritis with significant canal stenosis at the C6/C7 level where cord edema/gliosis is noted  Concern about cord involvement at this level  Pt needs to see neurosurgery  Consult  in emr  Also sending to Alanna Ip to review to see about timing of appt to review imaging with Neurosurgery team to see if appt needs to be expedited

## 2021-06-15 NOTE — TELEPHONE ENCOUNTER
Oh great  Much appreciated  Ed, Will your office call to give him time, date and location? ??  Nursing, please let pt know Neurosurgery giving fit in appt due to spinal cord involvement of his neck  ----- Message from Mason Hernadez PA-C sent at 6/15/2021  3:28 PM EDT -----  Renearosa Robles,    Will see him Monday Morning, Dr Candance Gala and I, I agree, this gent has a nasty problem  Thanks    Ed  ----- Message -----  From: Kim Barnes MD  Sent: 6/15/2021   3:23 PM EDT  To: Mason Hernadez PA-C, #    Nursing, Let pt know mri c spine with Multilevel spondylosis and osteoarthritis with significant canal stenosis at the C6/C7 level where cord edema/gliosis is noted  Concern about cord involvement at this level  Pt needs to see neurosurgery  Consult  in emr  Also sending to Ramonita Villarreal to review to see about timing of appt to review imaging with Neurosurgery team to see if appt needs to be expedited

## 2021-06-15 NOTE — TELEPHONE ENCOUNTER
Called and Left a message on [employer] machine for a call back (on communication consent)  MRI c-spine results already addressed, pt already has appt with neurosurgery

## 2021-06-16 ENCOUNTER — HOSPITAL ENCOUNTER (EMERGENCY)
Facility: HOSPITAL | Age: 66
Discharge: HOME | End: 2021-06-16
Attending: EMERGENCY MEDICINE | Admitting: EMERGENCY MEDICINE
Payer: COMMERCIAL

## 2021-06-16 ENCOUNTER — APPOINTMENT (EMERGENCY)
Dept: CT IMAGING | Facility: HOSPITAL | Age: 66
End: 2021-06-16
Payer: COMMERCIAL

## 2021-06-16 ENCOUNTER — TELEMEDICINE (OUTPATIENT)
Dept: FAMILY MEDICINE CLINIC | Facility: CLINIC | Age: 66
End: 2021-06-16
Payer: COMMERCIAL

## 2021-06-16 VITALS
DIASTOLIC BLOOD PRESSURE: 91 MMHG | BODY MASS INDEX: 22.71 KG/M2 | RESPIRATION RATE: 16 BRPM | OXYGEN SATURATION: 100 % | WEIGHT: 141.3 LBS | SYSTOLIC BLOOD PRESSURE: 147 MMHG | HEIGHT: 66 IN | HEART RATE: 86 BPM | TEMPERATURE: 97.8 F

## 2021-06-16 DIAGNOSIS — R93.89 ABNORMAL COMPUTED TOMOGRAPHY ANGIOGRAPHY (CTA) OF NECK: Primary | ICD-10-CM

## 2021-06-16 DIAGNOSIS — R93.0 ABNORMAL MRI OF HEAD: Primary | ICD-10-CM

## 2021-06-16 DIAGNOSIS — M48.02 CERVICAL STENOSIS OF SPINE: ICD-10-CM

## 2021-06-16 DIAGNOSIS — R93.89 ABNORMAL MRI, NECK: ICD-10-CM

## 2021-06-16 DIAGNOSIS — I10 ESSENTIAL HYPERTENSION: ICD-10-CM

## 2021-06-16 LAB
ALBUMIN SERPL BCP-MCNC: 4.5 G/DL (ref 3.4–4.8)
ALP SERPL-CCNC: 48.8 U/L (ref 10–129)
ALT SERPL W P-5'-P-CCNC: 17 U/L (ref 5–63)
ANION GAP SERPL CALCULATED.3IONS-SCNC: 11 MMOL/L (ref 4–13)
APTT PPP: 30 SECONDS (ref 23–31)
AST SERPL W P-5'-P-CCNC: 16 U/L (ref 15–41)
ATRIAL RATE: 63 BPM
BASOPHILS # BLD MANUAL: 0 THOUSAND/UL (ref 0–0.1)
BASOPHILS NFR MAR MANUAL: 0 % (ref 0–1)
BILIRUB SERPL-MCNC: 0.73 MG/DL (ref 0.3–1.2)
BUN SERPL-MCNC: 19 MG/DL (ref 6–20)
CALCIUM SERPL-MCNC: 9.8 MG/DL (ref 8.4–10.2)
CHLORIDE SERPL-SCNC: 102 MMOL/L (ref 96–108)
CO2 SERPL-SCNC: 26 MMOL/L (ref 22–33)
CREAT SERPL-MCNC: 1.23 MG/DL (ref 0.5–1.2)
EOSINOPHIL # BLD MANUAL: 0 THOUSAND/UL (ref 0–0.4)
EOSINOPHIL NFR BLD MANUAL: 0 % (ref 0–6)
ERYTHROCYTE [DISTWIDTH] IN BLOOD BY AUTOMATED COUNT: 14.1 % (ref 11.6–15.1)
GFR SERPL CREATININE-BSD FRML MDRD: 62 ML/MIN/1.73SQ M
GLUCOSE SERPL-MCNC: 135 MG/DL (ref 65–140)
HCT VFR BLD AUTO: 39.2 % (ref 36.5–49.3)
HGB BLD-MCNC: 13.8 G/DL (ref 12–17)
INR PPP: 1.12 (ref 0.9–1.1)
LYMPHOCYTES # BLD AUTO: 1.09 THOUSAND/UL (ref 0.6–4.47)
LYMPHOCYTES # BLD AUTO: 11 % (ref 14–44)
MCH RBC QN AUTO: 31.4 PG (ref 26.8–34.3)
MCHC RBC AUTO-ENTMCNC: 35.2 G/DL (ref 31.4–37.4)
MCV RBC AUTO: 89 FL (ref 82–98)
MONOCYTES # BLD AUTO: 0.5 THOUSAND/UL (ref 0–1.22)
MONOCYTES NFR BLD: 5 % (ref 4–12)
NEUTROPHILS # BLD MANUAL: 8.33 THOUSAND/UL (ref 1.85–7.62)
NEUTS BAND NFR BLD MANUAL: 8 % (ref 0–8)
NEUTS SEG NFR BLD AUTO: 76 % (ref 43–75)
P AXIS: 68 DEGREES
PLATELET # BLD AUTO: 152 THOUSANDS/UL (ref 149–390)
PLATELET BLD QL SMEAR: ADEQUATE
PMV BLD AUTO: 11 FL (ref 8.9–12.7)
POTASSIUM SERPL-SCNC: 3.3 MMOL/L (ref 3.5–5)
PR INTERVAL: 165 MS
PROT SERPL-MCNC: 7.5 G/DL (ref 6.4–8.3)
PROTHROMBIN TIME: 12.6 SECONDS (ref 9.5–12.1)
QRS AXIS: -63 DEGREES
QRSD INTERVAL: 92 MS
QT INTERVAL: 438 MS
QTC INTERVAL: 449 MS
RBC # BLD AUTO: 4.39 MILLION/UL (ref 3.88–5.62)
RBC MORPH BLD: NORMAL
SODIUM SERPL-SCNC: 139 MMOL/L (ref 133–145)
T WAVE AXIS: 56 DEGREES
TOTAL CELLS COUNTED SPEC: 100
VENTRICULAR RATE: 63 BPM
WBC # BLD AUTO: 9.92 THOUSAND/UL (ref 4.31–10.16)

## 2021-06-16 PROCEDURE — 36415 COLL VENOUS BLD VENIPUNCTURE: CPT | Performed by: EMERGENCY MEDICINE

## 2021-06-16 PROCEDURE — 80053 COMPREHEN METABOLIC PANEL: CPT | Performed by: EMERGENCY MEDICINE

## 2021-06-16 PROCEDURE — 99284 EMERGENCY DEPT VISIT MOD MDM: CPT | Performed by: EMERGENCY MEDICINE

## 2021-06-16 PROCEDURE — 93010 ELECTROCARDIOGRAM REPORT: CPT | Performed by: INTERNAL MEDICINE

## 2021-06-16 PROCEDURE — 85610 PROTHROMBIN TIME: CPT | Performed by: EMERGENCY MEDICINE

## 2021-06-16 PROCEDURE — 99284 EMERGENCY DEPT VISIT MOD MDM: CPT

## 2021-06-16 PROCEDURE — 85027 COMPLETE CBC AUTOMATED: CPT | Performed by: EMERGENCY MEDICINE

## 2021-06-16 PROCEDURE — 70498 CT ANGIOGRAPHY NECK: CPT

## 2021-06-16 PROCEDURE — 85730 THROMBOPLASTIN TIME PARTIAL: CPT | Performed by: EMERGENCY MEDICINE

## 2021-06-16 PROCEDURE — 85007 BL SMEAR W/DIFF WBC COUNT: CPT | Performed by: EMERGENCY MEDICINE

## 2021-06-16 PROCEDURE — 99213 OFFICE O/P EST LOW 20 MIN: CPT | Performed by: NURSE PRACTITIONER

## 2021-06-16 RX ORDER — POTASSIUM CHLORIDE 20 MEQ/1
40 TABLET, EXTENDED RELEASE ORAL ONCE
Status: COMPLETED | OUTPATIENT
Start: 2021-06-16 | End: 2021-06-16

## 2021-06-16 RX ADMIN — POTASSIUM CHLORIDE 40 MEQ: 1500 TABLET, EXTENDED RELEASE ORAL at 14:32

## 2021-06-16 RX ADMIN — IOHEXOL 85 ML: 350 INJECTION, SOLUTION INTRAVENOUS at 13:55

## 2021-06-16 NOTE — DISCHARGE INSTRUCTIONS
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

## 2021-06-16 NOTE — PROGRESS NOTES
Virtual Brief Visit    Abnormal MRI - elevated BPs - much higher then usual despite med adjustments  Will need CTA of the neck r/o any stenosis   See MRI of brain -> abnormal MRI findings of the neck too will need follow up with Neurosurgery and will refer him to cardiology after     Sent to the ER to see if we can get CTA done sooner then later and I will follow up further based on the Bps in the ER  And adjust meds further   Will need follow up with Cardiology also     Report give to ER doctor --> Dr Trejo - discussed that Bps I can handle I just need this CTA to be done to r/o any aneurism or stenosis of circulation to the brain -- and I will follow up with pt as soon as discharged     He will need to see Neurosurgery - abnormal cervical stenosis with incontinence - this has been an issues for the last few months where patient had altered mental status  and incontinence     Will follow up     Assessment/Plan:    Problem List Items Addressed This Visit        Cardiovascular and Mediastinum    Essential hypertension      Other Visit Diagnoses     Abnormal MRI of head    -  Primary    discussed with brother   will send to er for further eval --> will need CTA of neck -- r/o stenosis   high risk for stroke - acute onset of elevated BPs last mo    Abnormal MRI, neck        will need CTA of the neck / brain --> patient has had elevated BPs and has had increased confusion - will need follow up CTA sooner then later --> ER                 Reason for visit is   Chief Complaint   Patient presents with    Virtual Brief Visit     after phone call - pt brother came in to discuss in person     Virtual Brief Visit        Encounter provider Kimberly Mcclain, Albert Carrion    Provider located at [address]  [facility] [address] [facility] [address] [phone]    Recent Visits  No visits were found meeting these conditions    Showing recent visits within past 7 days and meeting all other requirements  Today's Visits  Date Type Provider Dept   06/16/21 Telemedicine [facility]   Showing today's visits and meeting all other requirements  Future Appointments  No visits were found meeting these conditions  Showing future appointments within next 150 days and meeting all other requirements       After connecting through telephone, the patient was identified by name and date of birth  Ramo Martin was informed that this is a telemedicine visit and that the visit is being conducted through telephone  My office door was closed  No one else was in the room  He acknowledged consent and understanding of privacy and security of the platform  The patient has agreed to participate and understands he can discontinue the visit at any time  Patient is aware this is a billable service       Subjective    Ramo Martin is a 63 y o  male   Patient Active Problem List   Diagnosis    Claudication of right lower extremity ([address] )    Essential hypertension    Hypercholesteremia    History of DVT (deep vein thrombosis)    Chronic pain of multiple joints    Moderate persistent asthma without complication    Cognitive and behavioral changes    History of stroke     Virtual Brief Visit  Initial via phone -> then brother came in office to further discuss plan of care     abnormal MRI - elevated BPs - much higher then usual despite med adjustments  Will need CTA of the neck r/o any stenosis   See MRI of brain -> abnormal MRI findings of the neck too will need follow up with Neurosurgery and will refer him to cardiology after          Past Medical History:   Diagnosis Date    Arthritis     Cancer ([address] )     High blood pressure     Hyperlipidemia     Hypertension     Stroke ([address] )     5 YEARS AGO       Past Surgical History:   Procedure Laterality Date    EXCISIONAL HEMORRHOIDECTOMY      OTHER SURGICAL HISTORY      stent        Current Outpatient Medications   Medication Sig Dispense Refill    amLODIPine (NORVASC) 5 mg tablet Take 1 tablet (5 mg total) by mouth daily 90 tablet 1    aspirin (ECOTRIN LOW STRENGTH) 81 mg EC tablet Take 1 tablet (81 mg total) by mouth daily 90 tablet 1    atorvastatin (LIPITOR) 40 mg tablet Take 1 tablet (40 mg total) by mouth daily at bedtime 90 tablet 1    bisacodyl (DULCOLAX) 5 mg EC tablet Take as directed as per written office instructions 2 tablet 0    citalopram (CeleXA) 10 mg tablet Take 1 tablet (10 mg total) by mouth daily 90 tablet 1    fluticasone-salmeterol (ADVAIR, WIXELA) 250-50 mcg/dose inhaler Inhale 1 puff 2 (two) times a day Rinse mouth after use 180 each 1    ipratropium-albuterol (DUO-NEB) 0 5-2 5 mg/3 mL nebulizer solution Take 1 vial (3 mL total) by nebulization 4 (four) times a day 360 mL 5    lisinopril-hydrochlorothiazide (PRINZIDE,ZESTORETIC) 10-12 5 MG per tablet Take 1 tablet by mouth daily 30 tablet 0    meloxicam (MOBIC) 15 mg tablet TAKE 1 TABLET (15 MG TOTAL) BY MOUTH DAILY PLEASE TAKE WITH FOOD 90 tablet 0    polyethylene glycol (GLYCOLAX) 17 GM/SCOOP powder Take as directed as per written office instructions 238 g 0     No current facility-administered medications for this visit  No Known Allergies    Review of Systems   Constitutional: Positive for fatigue and unexpected weight change (weight loss)  Negative for chills and fever  HENT: Negative for congestion and sore throat  Eyes: Negative  Respiratory: Positive for shortness of breath and wheezing  Negative for cough  Cardiovascular: Negative for chest pain and palpitations  Gastrointestinal: Negative for abdominal distention, nausea and vomiting  Incont of stool    Genitourinary:        Incontinence of urine    Musculoskeletal: Positive for arthralgias and gait problem  Shuffled gait and weakness and frequent falls   ABNORMAL MRI of c spine and brain   Reviewed    Skin:        Dry skin    Neurological: Positive for weakness  Negative for headaches  Hematological: Negative for adenopathy  Psychiatric/Behavioral: Positive for confusion, decreased concentration and sleep disturbance  Negative for agitation and behavioral problems  The patient is nervous/anxious  There were no vitals filed for this visit  I spent 20 minutes directly with the patient during this visit     IMPRESSION:     No acute intracranial abnormality      Moderate cerebral and mild posterior fossa chronic ischemic changes likely secondary to long-standing hypertension      Few scattered foci of right frontal lobe hemosiderin deposition which can be secondary to microbleed from chronic hypertension or the sequela of amyloid angiopathy      Chronic left occipital cortical and subcortical post ischemic gliosis      Poor left vertebral artery flow-void, presumably due to proximal stenosis and atherosclerotic disease  Nonemergent CTA of the neck could be performed for further evaluation         Workstation performed: [id number]      Imaging    MRI brain with and without contrast (Order: [id number]) - 6/10/2021      Study Result    Narrative & Impression   MRI CERVICAL SPINE WITH AND WITHOUT CONTRAST     INDICATION: R41 89: Other symptoms and signs involving cognitive functions and awareness  R46 89: Other symptoms and signs involving appearance and behavior      COMPARISON:  None      TECHNIQUE:  Sagittal T1, sagittal T2, sagittal inversion recovery, axial 2D merge and axial T2  Sagittal T1 and axial T1 postcontrast     IV Contrast:  6 mL of Gadobutrol injection (SINGLE-DOSE)      IMAGE QUALITY:  Diagnostic      FINDINGS:     ALIGNMENT:  Straightening of normal cervical lordosis, degenerative grade 1 anterolisthesis of C3 on C4      MARROW SIGNAL:  Normal marrow signal is identified within the visualized bony structures    No discrete marrow lesion      CERVICAL AND VISUALIZED UPPER THORACIC CORD:  Severe canal stenosis (4 to 6 mm) with cord edema/gliosis at the C6-C7 level owing to circumferential disc bulge, marginal osteophytes and redundancy ligamenta flava      PREVERTEBRAL AND PARASPINAL SOFT TISSUES:  Normal      VISUALIZED POSTERIOR FOSSA:  The visualized posterior fossa demonstrates no abnormal signal      CERVICAL DISC SPACES:     C2-C3:  Mild bilateral facet arthrosis      C3-C4: Moderate, right greater than left facet arthrosis, very slight anterolisthesis, no critical stenosis      C4-C5:  Circumferential bulge, marginal osteophytes, right greater than left facet and left greater than right uncinate arthrosis  No critical stenosis      C5-C6:  Decreased disc height, small marginal osteophytes, minor bilateral facet and uncinate arthrosis      C6-C7:  Circumferential bulge, marginal osteophytes, bilateral facet arthrosis with redundancy ligamenta flava  Canal reduced to 4-6 mm, flattening of the cord with focal high signal edema/gliosis      C7-T1:  Normal      UPPER THORACIC DISC SPACES:  Normal      POSTCONTRAST IMAGING:  Normal      IMPRESSION:     Multilevel spondylosis and osteoarthritis with significant canal stenosis at the C6/C7 level where cord edema/gliosis is noted               Workstation performed: [id number]      Imaging    MRI cervical spine with and without contrast (Order: [id number]) - 6/10/2021    VIRTUAL VISIT DISCLAIMER    Roseanne Mcgovern acknowledges that he has consented to an online visit or consultation  He understands that the online visit is based solely on information provided by him, and that, in the absence of a face-to-face physical evaluation by the physician, the diagnosis he receives is both limited and provisional in terms of accuracy and completeness  This is not intended to replace a full medical face-to-face evaluation by the physician  Rosaenne Mcgovern understands and accepts these terms

## 2021-06-16 NOTE — TELEPHONE ENCOUNTER
Spoke with patient's brother Vicente Carrasquillo (per consent in chart)  Advised of results and recommendations  Brother verbalized understanding  He will call patient's PCP as soon as he gets home  Patient has appt 7/13 w/Cassi Barnett

## 2021-06-16 NOTE — ED PROVIDER NOTES
History  Chief Complaint   Patient presents with    Evaluation of Abnormal Diagnostic Test     Son reports pt had a ct a few days ago of head and neck, dr called today and said there was a small area of hemmorhage in brain and referred him here to be evaluated     Patient is a 27-year-old male with history of hypertension, hyperlipidemia, stroke who presents for evaluation of an abnormal outpatient brain MRI  The patient was sent in by his family doctor who I spoke with on the phone  According to her, there were signs of stenosis any non emergent CTA of the neck was recommended  She sent him in today because according to her his blood pressures have been elevated in the 190s over the past few days  The patient was able to ambulate into the department  He denies any complaints at this time  Apparently he has had multiple tests done over the past few weeks for his workup of possible dementia  He denies chest pain, shortness of breath, lightheadedness, dizziness, nausea, vomiting  Prior to Admission Medications   Prescriptions Last Dose Informant Patient Reported? Taking?    amLODIPine (NORVASC) 5 mg tablet 6/16/2021 at Unknown time  No Yes   Sig: Take 1 tablet (5 mg total) by mouth daily   aspirin (ECOTRIN LOW STRENGTH) 81 mg EC tablet 6/16/2021 at Unknown time  No Yes   Sig: Take 1 tablet (81 mg total) by mouth daily   atorvastatin (LIPITOR) 40 mg tablet 6/15/2021 at Unknown time  No Yes   Sig: Take 1 tablet (40 mg total) by mouth daily at bedtime   bisacodyl (DULCOLAX) 5 mg EC tablet 6/16/2021 at Unknown time  No Yes   Sig: Take as directed as per written office instructions   citalopram (CeleXA) 10 mg tablet 6/16/2021 at Unknown time  No Yes   Sig: Take 1 tablet (10 mg total) by mouth daily   fluticasone-salmeterol (ADVAIR, WIXELA) 250-50 mcg/dose inhaler More than a month at Unknown time  No No   Sig: Inhale 1 puff 2 (two) times a day Rinse mouth after use   ipratropium-albuterol (DUO-NEB) 0 5-2 5 mg/3 mL nebulizer solution   No No   Sig: Take 1 vial (3 mL total) by nebulization 4 (four) times a day   lisinopril-hydrochlorothiazide (PRINZIDE,ZESTORETIC) 10-12 5 MG per tablet 6/16/2021 at Unknown time  No Yes   Sig: Take 1 tablet by mouth daily   meloxicam (MOBIC) 15 mg tablet 6/16/2021 at Unknown time Self No Yes   Sig: TAKE 1 TABLET (15 MG TOTAL) BY MOUTH DAILY PLEASE TAKE WITH FOOD   polyethylene glycol (GLYCOLAX) 17 GM/SCOOP powder 6/16/2021 at Unknown time  No Yes   Sig: Take as directed as per written office instructions      Facility-Administered Medications: None       Past Medical History:   Diagnosis Date    Arthritis     Cancer ([facility] )     High blood pressure     Hyperlipidemia     Hypertension     Stroke ([facility] )     5 YEARS AGO       Past Surgical History:   Procedure Laterality Date    EXCISIONAL HEMORRHOIDECTOMY      OTHER SURGICAL HISTORY      stent        Family History   Problem Relation Age of Onset    Hypertension Mother     No Known Problems Father      I have reviewed and agree with the history as documented  E-Cigarette/Vaping    E-Cigarette Use Never User      E-Cigarette/Vaping Substances     Social History     Tobacco Use    Smoking status: Current Every Day Smoker     Packs/day: 1 00     Years: 30 00     Pack years: 30 00     Types: Cigarettes    Smokeless tobacco: Never Used   Vaping Use    Vaping Use: Never used   Substance Use Topics    Alcohol use: Yes     Comment: Occasional     Drug use: Never       Review of Systems   Constitutional: Negative for chills, fever and unexpected weight change  HENT: Negative for congestion, sore throat and trouble swallowing  Eyes: Negative for pain, discharge and itching  Respiratory: Negative for cough, chest tightness, shortness of breath and wheezing  Cardiovascular: Negative for chest pain, palpitations and leg swelling     Gastrointestinal: Negative for abdominal pain, blood in stool, diarrhea, nausea and vomiting  Endocrine: Negative for polyuria  Genitourinary: Negative for difficulty urinating, dysuria, frequency and hematuria  Musculoskeletal: Negative for arthralgias and back pain  Neurological: Negative for dizziness, syncope, weakness, light-headedness and headaches  Physical Exam  Physical Exam  Vitals and nursing note reviewed  Constitutional:       General: He is not in acute distress  Appearance: He is well-developed  HENT:      Head: Normocephalic and atraumatic  Right Ear: External ear normal       Left Ear: External ear normal    Eyes:      Conjunctiva/sclera: Conjunctivae normal       Pupils: Pupils are equal, round, and reactive to light  Cardiovascular:      Rate and Rhythm: Normal rate and regular rhythm  Heart sounds: Normal heart sounds  No murmur heard  No friction rub  No gallop  Pulmonary:      Effort: Pulmonary effort is normal  No respiratory distress  Breath sounds: Normal breath sounds  No wheezing or rales  Abdominal:      General: Bowel sounds are normal  There is no distension  Palpations: Abdomen is soft  Tenderness: There is no abdominal tenderness  There is no guarding  Musculoskeletal:         General: No tenderness or deformity  Normal range of motion  Cervical back: Normal range of motion  Lymphadenopathy:      Cervical: No cervical adenopathy  Skin:     General: Skin is warm and dry  Neurological:      General: No focal deficit present  Mental Status: He is alert and oriented to person, place, and time  Mental status is at baseline  Cranial Nerves: No cranial nerve deficit  Sensory: No sensory deficit  Motor: No weakness or abnormal muscle tone     Psychiatric:         Behavior: Behavior normal          Vital Signs  ED Triage Vitals [06/16/21 1306]   Temperature Pulse Respirations Blood Pressure SpO2   97 8 °F (36 6 °C) 103 16 131/88 98 %      Temp Source Heart Rate Source Patient Position - Orthostatic VS BP Location FiO2 (%)   Oral Monitor Sitting Left arm --      Pain Score       --           Vitals:    06/16/21 1306 06/16/21 1433 06/16/21 1551   BP: 131/88 132/80 147/91   Pulse: 103 86 86   Patient Position - Orthostatic VS: Sitting  Lying         Visual Acuity      ED Medications  Medications   potassium chloride (K-DUR,KLOR-CON) CR tablet 40 mEq (40 mEq Oral Given 6/16/21 1432)   iohexol (OMNIPAQUE) 350 MG/ML injection (SINGLE-DOSE) 85 mL (85 mL Intravenous Given 6/16/21 1355)       Diagnostic Studies  Results Reviewed     Procedure Component Value Units Date/Time    CBC and differential [[id number]]  (Normal) Collected: 06/16/21 1321    Lab Status: Final result Specimen: Blood from Arm, Left Updated: 06/16/21 1404     WBC 9 92 Thousand/uL      RBC 4 39 Million/uL      Hemoglobin 13 8 g/dL      Hematocrit 39 2 %      MCV 89 fL      MCH 31 4 pg      MCHC 35 2 g/dL      RDW 14 1 %      MPV 11 0 fL      Platelets 944 Thousands/uL     Narrative: This is an appended report  These results have been appended to a previously verified report      Manual Differential(PHLEBS Do Not Order) [[id number]]  (Abnormal) Collected: 06/16/21 1321    Lab Status: Final result Specimen: Blood from Arm, Left Updated: 06/16/21 1404     Segmented % 76 %      Bands % 8 %      Lymphocytes % 11 %      Monocytes % 5 %      Eosinophils, % 0 %      Basophils % 0 %      Absolute Neutrophils 8 33 Thousand/uL      Lymphocytes Absolute 1 09 Thousand/uL      Monocytes Absolute 0 50 Thousand/uL      Eosinophils Absolute 0 00 Thousand/uL      Basophils Absolute 0 00 Thousand/uL      Total Counted 100     RBC Morphology Normal     Platelet Estimate Adequate    Comprehensive metabolic panel [[id number]]  (Abnormal) Collected: 06/16/21 1321    Lab Status: Final result Specimen: Blood from Arm, Left Updated: 06/16/21 1343     Sodium 139 mmol/L      Potassium 3 3 mmol/L      Chloride 102 mmol/L      CO2 26 mmol/L      ANION GAP 11 mmol/L BUN 19 mg/dL      Creatinine 1 23 mg/dL      Glucose 135 mg/dL      Calcium 9 8 mg/dL      AST 16 U/L      ALT 17 U/L      Alkaline Phosphatase 48 8 U/L      Total Protein 7 5 g/dL      Albumin 4 5 g/dL      Total Bilirubin 0 73 mg/dL      eGFR 62 ml/min/1 73sq m     Narrative:      [place] guidelines for Chronic Kidney Disease (CKD):     Stage 1 with normal or high GFR (GFR > 90 mL/min/1 73 square meters)    Stage 2 Mild CKD (GFR = 60-89 mL/min/1 73 square meters)    Stage 3A Moderate CKD (GFR = 45-59 mL/min/1 73 square meters)    Stage 3B Moderate CKD (GFR = 30-44 mL/min/1 73 square meters)    Stage 4 Severe CKD (GFR = 15-29 mL/min/1 73 square meters)    Stage 5 End Stage CKD (GFR <15 mL/min/1 73 square meters)  Note: GFR calculation is accurate only with a steady state creatinine    Protime-INR [[id number]]  (Abnormal) Collected: 06/16/21 1321    Lab Status: Final result Specimen: Blood from Arm, Left Updated: 06/16/21 1339     Protime 12 6 seconds      INR 1 12    Narrative:      INR Reference Ranges:  No Anticoagulant, Normal:           0 9-1 1  Standard Dose, Oral Anticoagulant:  2 0-3 0  High Dose, Oral Anticoagulant:      2 5-3 5    APTT [[id number]]  (Normal) Collected: 06/16/21 1321    Lab Status: Final result Specimen: Blood from Arm, Left Updated: 06/16/21 1339     PTT 30 seconds                  CTA neck with and without contrast   Final Result by Mila Conroy MD (06/16 1547)      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, 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  The post-PICA segment of the left vertebral artery is markedly diminutive but patent  The dominant right vertebral artery is    unremarkable  Mild intracranial carotid artery atherosclerotic disease  No hemodynamically significant stenosis of the cervical carotids                 Workstation performed: [id number]                    Procedures  Procedures ED Course                                           MDM  Number of Diagnoses or Management Options  Diagnosis management comments: 64 yo M sent in for evaluation by PCP for CTA neck after abnomral MRI of head reading  Per radiology, non-emergent CTA neck was recommended, however, according to PCP, patient's BP has been elevated over the last few days so she thought he should be evaluated more emergently  Patient has no complaints at this time, Vitals are WNL including BP  Will obtain basic labs, CTA Neck  Disposition  Final diagnoses:   Abnormal computed tomography angiography (CTA) of neck     Time reflects when diagnosis was documented in both MDM as applicable and the Disposition within this note     Time User Action Codes Description Comment    6/16/2021  3:51 PM Lynn Jonathan Add [[id number]] Abnormal computed tomography angiography (CTA) of neck       ED Disposition     ED Disposition Condition Date/Time Comment    Discharge Stable Wed Jun 16, 2021  3:51 PM Marcano Joao discharge to home/self care              Follow-up Information     Follow up With Specialties Details Why Contact Info    Ashley Garcia, [address] Nurse Practitioner, Family Medicine Schedule an appointment as soon as possible for a visit  For follow up of CTA neck results [address]  [phone]            Discharge Medication List as of 6/16/2021  3:51 PM      CONTINUE these medications which have NOT CHANGED    Details   amLODIPine (NORVASC) 5 mg tablet Take 1 tablet (5 mg total) by mouth daily, Starting Mon 5/24/2021, Until Sun 8/22/2021, Normal      aspirin (ECOTRIN LOW STRENGTH) 81 mg EC tablet Take 1 tablet (81 mg total) by mouth daily, Starting Mon 5/24/2021, Until Wed 6/23/2021, Normal      atorvastatin (LIPITOR) 40 mg tablet Take 1 tablet (40 mg total) by mouth daily at bedtime, Starting Mon 5/24/2021, Until Sun 8/22/2021, Normal      bisacodyl (DULCOLAX) 5 mg EC tablet Take as directed as per written office instructions, Normal      citalopram (CeleXA) 10 mg tablet Take 1 tablet (10 mg total) by mouth daily, Starting Mon 5/24/2021, Until Sun 8/22/2021, Normal      fluticasone-salmeterol (ADVAIR, WIXELA) 250-50 mcg/dose inhaler Inhale 1 puff 2 (two) times a day Rinse mouth after use, Starting Mon 5/24/2021, Normal      ipratropium-albuterol (DUO-NEB) 0 5-2 5 mg/3 mL nebulizer solution Take 1 vial (3 mL total) by nebulization 4 (four) times a day, Starting Mon 5/17/2021, Normal      lisinopril-hydrochlorothiazide (PRINZIDE,ZESTORETIC) 10-12 5 MG per tablet Take 1 tablet by mouth daily, Starting Thu 6/10/2021, Normal      meloxicam (MOBIC) 15 mg tablet TAKE 1 TABLET (15 MG TOTAL) BY MOUTH DAILY PLEASE TAKE WITH FOOD, Starting Thu 5/13/2021, Until Wed 8/11/2021, Normal      polyethylene glycol (GLYCOLAX) 17 GM/SCOOP powder Take as directed as per written office instructions, Normal           No discharge procedures on file      PDMP Review     None          ED Provider  Electronically Signed by           Jose Isaac DO  06/16/21 9071

## 2021-06-17 ENCOUNTER — TELEPHONE (OUTPATIENT)
Dept: FAMILY MEDICINE CLINIC | Facility: CLINIC | Age: 66
End: 2021-06-17

## 2021-06-17 NOTE — TELEPHONE ENCOUNTER
Pt was in ER 6/16 - Per Yassine Herbert, pt to follow up in the office on Friday 6/18    Left message for pt to call the office to schedule

## 2021-06-19 ENCOUNTER — HOSPITAL ENCOUNTER (EMERGENCY)
Facility: HOSPITAL | Age: 66
Discharge: HOME | End: 2021-06-19
Attending: EMERGENCY MEDICINE
Payer: COMMERCIAL

## 2021-06-19 ENCOUNTER — TELEPHONE (OUTPATIENT)
Dept: CASE MANAGEMENT | Facility: HOSPITAL | Age: 66
End: 2021-06-19

## 2021-06-19 VITALS
BODY MASS INDEX: 22.66 KG/M2 | HEART RATE: 75 BPM | SYSTOLIC BLOOD PRESSURE: 118 MMHG | OXYGEN SATURATION: 96 % | HEIGHT: 66 IN | TEMPERATURE: 97.6 F | WEIGHT: 141 LBS | DIASTOLIC BLOOD PRESSURE: 61 MMHG | RESPIRATION RATE: 16 BRPM

## 2021-06-19 DIAGNOSIS — R41.0 CONFUSION: Primary | ICD-10-CM

## 2021-06-19 PROCEDURE — 99284 EMERGENCY DEPT VISIT MOD MDM: CPT | Performed by: EMERGENCY MEDICINE

## 2021-06-19 PROCEDURE — 99284 EMERGENCY DEPT VISIT MOD MDM: CPT
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
## 2021-06-19 NOTE — ED NOTES
Pt sleeping with easy respirations; easily arousable; pt with no needs at this time     April Saravia RN  06/19/21 9586
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
## 2021-06-19 NOTE — DISCHARGE INSTRUCTIONS
Siddharth Watson is alert and oriented - he had normal labs and CTA yesterday  There are no acute problems with Siddharth Watson   Please follow up with

## 2021-06-19 NOTE — ED NOTES
Pts brother called to pick patient up  Pts brother angry that patient was being d/c so soon  Explained to patients brother that patient had a full work-up yesterday and was completely oriented here in ED  Patients brother then asking for supervisor, requesting this writer's name, and threatening that "he was going to make a few phone calls"  Patients brother then stated he was on his way and hung up on this writer  MD and security made aware        Ronaldo Ghotra, RN  06/19/21 9826

## 2021-06-28 ENCOUNTER — TELEPHONE (OUTPATIENT)
Dept: NEUROSURGERY | Facility: CLINIC | Age: 66
End: 2021-06-28

## 2021-06-28 NOTE — TELEPHONE ENCOUNTER
8/31/21  NO SHOW FOR TODAYS APPOINTMENT  MAILBOX FULL    UNABLE TO REACH LETTER SENT  4TH NO SHOW:  6/21/21 / 6/28/21 / 7/29/21 / 8/31/21 7/29/21  NO SHOW FOR TODAYS APPOINTMENT  LM FOR PATIENT TO CALL AND RESCHEDULE APPOINTMENT  PATIENT WAS A NO SHOW FOR HIS 6/21/21 & 6/28/21 APPTS ALSO     6/28/21  NO SHOW FOR TODAYS APPOINTMENT  LM FOR PATIENT TO CALL AND RESCHEDULE APPOINTMENT  PATIENT WAS A NO SHOW FOR HIS 6/21/21 APPT ALSO

## 2021-06-30 ENCOUNTER — TELEPHONE (OUTPATIENT)
Dept: FAMILY MEDICINE CLINIC | Facility: CLINIC | Age: 66
End: 2021-06-30

## 2021-06-30 NOTE — TELEPHONE ENCOUNTER
Filled out and faxed     Reach out to patient or brother and see what is going on as he missed the  [facility] appt

## 2021-07-01 ENCOUNTER — TELEPHONE (OUTPATIENT)
Dept: PSYCHIATRY | Facility: CLINIC | Age: 66
End: 2021-07-01

## 2021-07-01 NOTE — TELEPHONE ENCOUNTER
Left a message for Alyson Piedra to call us back and let us know how the patient is doing and why he has missed his recent appointments

## 2021-07-09 DIAGNOSIS — G89.29 CHRONIC PAIN OF MULTIPLE JOINTS: ICD-10-CM

## 2021-07-09 DIAGNOSIS — M25.50 CHRONIC PAIN OF MULTIPLE JOINTS: ICD-10-CM

## 2021-07-09 RX ORDER — MELOXICAM 15 MG/1
15 TABLET ORAL DAILY
Qty: 90 TABLET | Refills: 0 | Status: SHIPPED | OUTPATIENT
Start: 2021-07-09 | End: 2021-10-07

## 2021-07-13 ENCOUNTER — TELEPHONE (OUTPATIENT)
Dept: NEUROLOGY | Facility: CLINIC | Age: 66
End: 2021-07-13

## 2021-07-13 NOTE — LETTER
July 13, 2021     [address] [address]     Dear Mr Ventura Simpson,    Appointment Missed: 7/13/2021      Appointment Scheduled with: DO Meredith Schaefer MD  We understand that many situations arise that occasionally prevents patients from keeping scheduled appointments  It is the policy of [facility] that patients notify us 24 hours in advance if unable to keep a scheduled appointment  Missed appointments jeopardize strong physician-patient relationships  The appointment you missed could have easily been made available to another patient if you had contacted us to cancel  We like to accommodate all of our patients, but when patients miss an appointment it prevents us from being able to help everyone  In the future, we request at least a 24 hour notice of cancellation so we can make your appointment available to someone else in need         Sincerely,    The Physicians and Staff of [facility]

## 2021-07-14 ENCOUNTER — TELEPHONE (OUTPATIENT)
Dept: NEUROLOGY | Facility: CLINIC | Age: 66
End: 2021-07-14

## 2021-07-14 NOTE — TELEPHONE ENCOUNTER
Kristina Bruno,    This patient's phone must not be in service as when you place the call; there is no dial tone  I will send a certified letter to his home requesting him to call our office  Letter has been mailed to the patient

## 2021-07-14 NOTE — TELEPHONE ENCOUNTER
----- Message from Myla An DO sent at 7/13/2021  2:31 PM EDT -----  Jovani Snowden did not show up for his 2 PM appointment today  He also recently missed a fit-in appointment with Neurosurgery as well  Wanted to see if we can reach out to the family again, and if no response, we will probably need to send a certified letter  Please let me know if there is anything that you need for me to do  Thank you!     Chauncey Walls

## 2021-07-14 NOTE — LETTER
July 14, 2021            [address]     Dear Mr Arias Meseret:      Our office has been unsuccessful in trying to reach you by phone regarding:      ? Office appointment      Please contact our office at your earliest convenience  Please call [phone]  and follow the prompts        Sincerely,      [facility]

## 2021-07-27 DIAGNOSIS — I73.9 CLAUDICATION OF RIGHT LOWER EXTREMITY (HCC): ICD-10-CM

## 2021-07-27 DIAGNOSIS — Z86.718 HISTORY OF DVT (DEEP VEIN THROMBOSIS): ICD-10-CM

## 2021-07-27 RX ORDER — ASPIRIN 81 MG/1
81 TABLET ORAL DAILY
Qty: 90 TABLET | Refills: 1 | Status: SHIPPED | OUTPATIENT
Start: 2021-07-27 | End: 2021-08-26

## 2021-08-06 DIAGNOSIS — R15.9 FULL INCONTINENCE OF FECES: ICD-10-CM

## 2021-08-06 RX ORDER — POLYETHYLENE GLYCOL 3350 17 G/17G
POWDER, FOR SOLUTION ORAL
Qty: 238 EACH | Refills: 0 | Status: SHIPPED | OUTPATIENT
Start: 2021-08-06

## 2021-08-18 DIAGNOSIS — F41.8 DEPRESSION WITH ANXIETY: ICD-10-CM

## 2021-08-19 RX ORDER — CITALOPRAM 10 MG/1
10 TABLET ORAL DAILY
Qty: 30 TABLET | Refills: 0 | Status: SHIPPED | OUTPATIENT
Start: 2021-08-19 | End: 2021-09-17

## 2021-08-30 ENCOUNTER — TELEPHONE (OUTPATIENT)
Dept: FAMILY MEDICINE CLINIC | Facility: CLINIC | Age: 66
End: 2021-08-30

## 2021-09-15 DIAGNOSIS — F41.8 DEPRESSION WITH ANXIETY: ICD-10-CM

## 2021-09-17 RX ORDER — CITALOPRAM 10 MG/1
10 TABLET ORAL DAILY
Qty: 30 TABLET | Refills: 0 | Status: SHIPPED | OUTPATIENT
Start: 2021-09-17 | End: 2021-10-12

## 2021-09-21 ENCOUNTER — TELEPHONE (OUTPATIENT)
Dept: NEUROLOGY | Facility: CLINIC | Age: 66
End: 2021-09-21

## 2021-09-21 NOTE — TELEPHONE ENCOUNTER
Can we please schedule pt for follow up   He has been missing all these specialists appt   I need to figure out what is going on and if unable to get him send a letter

## 2021-09-21 NOTE — TELEPHONE ENCOUNTER
MSW phoned patient's brother, Elba Mejia, at [phone]  MSW explained that the Service Coordination Department had been trying to reach patient, but apparently had a wrong number  MSW advised that he/patient would need to contact the [address] [place] [address] to request services  MSW offered to facilitate the conference call or provide the number  Elba Mejia was interested in the conference call  MSW facilitated conference call with the [place] [address], [facility]  at [phone] and reached Luc Pool stated that she would send an email to the Service Coordination Department and that a  should be assigned within 5 business days  Luc Pool also noted that there is no HIPAA form on file, so she emailed one to patient's brother at [email or web address]  Luc Pool advised that both patient and brother will need to sign same and return it so all the proper releases are on file  While on the phone, patient's brother stated that "I was supposed to be getting paid to care for my brother" since June when he was approved, and asked if he could receive back payments  Luc Pool was able to verify that patient's eligibility started in June and stated that it might be a possibility to get retro-active payments, but that this something he will need to speak to the assigned  about  It was decided that MSW would follow-up with the [address] [place] [address] in 5 business days to inquire if a  was assigned  MSW will then update patient/brother as able  MSW noted that Dr Consuelo Pagan also sent message to [place] [address] to try to get patient scheduled with them and to be assessed for resources  MSW spoke with Dave Hernandez LCSW, at [facility], and advised that this writer is already working with patient/brother to get services in place   Serena Angulo agreed that she would be referring patient for the same services, so she will sign off of case  MSW will provide update to Dr Ginger Glasgow

## 2021-09-21 NOTE — TELEPHONE ENCOUNTER
Please see full response in other phone message  Thanks for the follow up on pt  Please see if there are any other resources for more long term placement needs

## 2021-09-21 NOTE — TELEPHONE ENCOUNTER
MSW phoned the [place] [facility] this date at [phone] and spoke with Rich Sheppard- he advised that patient was approved for the Waiver as of 6/16 and that a letter was sent out to patient's home address on 6/17 advising him of this  Rich Sheppard stated that patient's Service Coordination Agency is [facility] - case # is [id number] and the phone number for this agency is [phone]  MSW phoned patient's brother, Esther Pablo, at [phone] to confirm if a  has reached out to them to set up services  Esther Pablo stated that he knows patient was approved, but that no one has reached out and that patient is not yet receiving any help  Esther Pablo stated "I am not sure what is going on with those people"  MSW advised that this writer will contact [facility] to get connected to patient's  and find out why patient has not yet begun to receive services since his approval was 3 months ago  MSW will then update patient's brother, Esther Pablo, as able
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
## 2021-09-21 NOTE — TELEPHONE ENCOUNTER
MSW phoned [phone] and spoke with Julien Pittman - she advised that a  has yet to be assigned  Julien Pittman stated that they have tried to reach this patient 4 times, the last time being on 8/30 and they have not been able to connect with him  Julien Pittman was unable to share the number that they have on file for him, but it appears that this office has a different number than they do  Julien Pittman advised that the participant (or his brother Lakeshia Mcgee, who is on file as being an authorized representative) must call to get the process of being assigned to a  started  Julien Pittman stated that once the process is stated that it will only take up to 5 business days for patient to be assigned to a   HARMAN advised that patient's brother, Lakeshia Mcgee, does appear to be handling patient's affairs and Julien Pittman advised that they can speak with him  MSW attempted to conference patient's brother, Lakeshia Mcgee, into the call  He answered but was heading into his own MD appt, and was not able to speak with Julien Pittman advised that patient's brother can call back into the [place] [address], [facility] directly at [phone]       Reference # for above call is [id number]     MSW will call patient's brother back later this date to provide the above information and number to call to begin Service Coordination for the Waiver program

## 2021-09-21 NOTE — TELEPHONE ENCOUNTER
Rev Dr Ruby Padron and Bambi Del Angel notes  Called pt brother Chas Bain and confirmed phone number as well  No answer but able to leave detailed message  Strongly recommended to Chas Bain for pt and family to consider long term placement for pt in a skilled facility to help meet all his needs  Family to also contact pcp office to help with medical issues and placement consideration  Strongly encouraged compliance with follow up appts as well  Rev recommendation by Dr Ruby Padron as well for placement and help from [facility] as a resource  Nursing, please follow up on my above phone call for any additional questions

## 2021-09-21 NOTE — TELEPHONE ENCOUNTER
Spoke with Yovani Sevilla and he said that patient will be back in [place] on Friday and that they will call the office to schedule an appointment to come in and see you

## 2021-09-21 NOTE — TELEPHONE ENCOUNTER
Hi All I am the PCP   MY nurse did speak to patient's brother who stated that patient is visiting family members in [place]   Will call us when he is back and if follow up still needed ?      We did encourage him that patient does need close follow up and monitoring and he is to have him evaluated by doctors in [place] if he is residing there     I have encouraged placement on multiple occasions so brother requested assistance from family in [place] and patient was  was brought there   Will keep you posted     Thank you

## 2021-09-21 NOTE — TELEPHONE ENCOUNTER
Is he seeing specialists in [place] - does he have providers ? There are multiple missed visits since May ??    If so we need to document   If not he has to try to make the appts

## 2021-09-21 NOTE — TELEPHONE ENCOUNTER
I spoke to pt's brother, Jerome Rodriguez  He stated that pt is in [place] right now visiting family, but that when he gets back he will call the office to be scheduled
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
## 2021-09-23 NOTE — TELEPHONE ENCOUNTER
MSW phoned patient's brother at [phone]  Patient's brother advised with he spoke with patient's daughter and that patient will be staying in [place], [place] with his daughter  Patient's brother stated that patient's daughter has already cancelled his insurance here in [place]  MSW advised that patient will likely need to reapply for services in [place]  MSW provided the following resources/numbers:    [employer] [facility]  [address]  [phone]    While on the phone, patient's brother inquired if he would be eligible to receive retro payments for caring for patient since June when he was approved for the Waiver  MSW advised that the /Service Coordination Agency would need to speak to that  MSW is unsure if a  will be assigned if patient's insurance was terminated, but will follow-up with the Service Coordination agency on 9/28 as planned to follow-up

## 2021-09-28 DIAGNOSIS — E78.00 HYPERCHOLESTEREMIA: ICD-10-CM

## 2021-09-28 RX ORDER — ATORVASTATIN CALCIUM 40 MG/1
40 TABLET, FILM COATED ORAL
Qty: 90 TABLET | Refills: 1 | Status: SHIPPED | OUTPATIENT
Start: 2021-09-28 | End: 2021-12-27

## 2021-09-30 ENCOUNTER — TELEPHONE (OUTPATIENT)
Dept: NEUROLOGY | Facility: CLINIC | Age: 66
End: 2021-09-30

## 2021-09-30 NOTE — TELEPHONE ENCOUNTER
MSW received incoming call from patient's brother  He requested callback to [phone]  MSW attempted to reach patient's brother, Sarah Latif  No answer at [phone]  MSW left message requesting callback  Awaiting same  MSW then received callback from patient's brother, Sarah Latif - he requested the number for [address] [place] [address],[address], as he is wishing to inquire about being paid retroactively for caring for patient  MSW provided the phone number as [phone] and his case # as [id number]  Sarah Latif did confirm that patient will be remaining in [place]     MSW will be available as needed

## 2021-10-04 DIAGNOSIS — I10 ESSENTIAL HYPERTENSION: ICD-10-CM

## 2021-10-04 RX ORDER — AMLODIPINE BESYLATE 5 MG/1
5 TABLET ORAL DAILY
Qty: 90 TABLET | Refills: 1 | Status: SHIPPED | OUTPATIENT
Start: 2021-10-04 | End: 2022-01-02

## 2021-10-04 RX ORDER — POLYETHYLENE GLYCOL 3350 17 G/17G
POWDER ORAL
COMMUNITY
Start: 2021-08-19

## 2021-10-12 DIAGNOSIS — F41.8 DEPRESSION WITH ANXIETY: ICD-10-CM

## 2021-10-12 RX ORDER — CITALOPRAM 10 MG/1
10 TABLET ORAL DAILY
Qty: 30 TABLET | Refills: 0 | Status: SHIPPED | OUTPATIENT
Start: 2021-10-12 | End: 2022-01-10

## 2021-10-19 ENCOUNTER — TELEPHONE (OUTPATIENT)
Dept: FAMILY MEDICINE CLINIC | Facility: CLINIC | Age: 66
End: 2021-10-19

## 2021-10-27 DIAGNOSIS — J42 CHRONIC BRONCHITIS, UNSPECIFIED CHRONIC BRONCHITIS TYPE (HCC): ICD-10-CM

## 2021-10-27 RX ORDER — IPRATROPIUM BROMIDE AND ALBUTEROL SULFATE 2.5; .5 MG/3ML; MG/3ML
3 SOLUTION RESPIRATORY (INHALATION) 4 TIMES DAILY
Qty: 360 ML | Refills: 5 | Status: SHIPPED | OUTPATIENT
Start: 2021-10-27 | End: 2021-12-03

## 2021-11-02 DIAGNOSIS — R15.9 FULL INCONTINENCE OF FECES: ICD-10-CM

## 2021-11-03 RX ORDER — POLYETHYLENE GLYCOL 3350 17 G/17G
POWDER, FOR SOLUTION ORAL
Qty: 238 EACH | Refills: 0 | Status: SHIPPED | OUTPATIENT
Start: 2021-11-03

## 2021-11-08 ENCOUNTER — DOCUMENTATION (OUTPATIENT)
Dept: NEUROLOGY | Facility: CLINIC | Age: 66
End: 2021-11-08

## 2021-12-03 DIAGNOSIS — J42 CHRONIC BRONCHITIS, UNSPECIFIED CHRONIC BRONCHITIS TYPE (HCC): ICD-10-CM

## 2021-12-03 RX ORDER — IPRATROPIUM BROMIDE AND ALBUTEROL SULFATE 2.5; .5 MG/3ML; MG/3ML
3 SOLUTION RESPIRATORY (INHALATION) 4 TIMES DAILY
Qty: 360 ML | Refills: 5 | Status: SHIPPED | OUTPATIENT
Start: 2021-12-03

## 2022-01-26 DIAGNOSIS — F41.8 DEPRESSION WITH ANXIETY: ICD-10-CM

## 2022-01-27 RX ORDER — CITALOPRAM 10 MG/1
10 TABLET ORAL DAILY
Qty: 30 TABLET | Refills: 0 | OUTPATIENT
Start: 2022-01-27 | End: 2022-04-27

## 2022-01-27 NOTE — TELEPHONE ENCOUNTER
Has not been seen in a long time last time we checked his move   Please reach out and see if back in this area   Will need visit

## 2022-04-20 ENCOUNTER — TELEPHONE (OUTPATIENT)
Dept: PULMONOLOGY | Facility: CLINIC | Age: 67
End: 2022-04-20

## 2022-04-20 NOTE — TELEPHONE ENCOUNTER
Lm for pt to call back to reschedule 5/16 appt with Dr Mila Ramirez due to his schedule changing  Please reschedule through appt already made

## 2022-04-29 ENCOUNTER — TELEPHONE (OUTPATIENT)
Dept: PULMONOLOGY | Facility: CLINIC | Age: 67
End: 2022-04-29

## 2022-04-29 NOTE — TELEPHONE ENCOUNTER
Called pt to reschedule appt with Dr Jovanni Freeman, I reached his brother, who said that the pt moved to [place] with his daughter and has new drs up there, so we cancelled his appt